# Patient Record
Sex: FEMALE | Race: WHITE | Employment: UNEMPLOYED | URBAN - METROPOLITAN AREA
[De-identification: names, ages, dates, MRNs, and addresses within clinical notes are randomized per-mention and may not be internally consistent; named-entity substitution may affect disease eponyms.]

---

## 2022-07-02 ENCOUNTER — APPOINTMENT (OUTPATIENT)
Dept: CT IMAGING | Age: 37
End: 2022-07-02
Attending: EMERGENCY MEDICINE
Payer: MEDICAID

## 2022-07-02 ENCOUNTER — APPOINTMENT (OUTPATIENT)
Dept: CT IMAGING | Age: 37
DRG: 385 | End: 2022-07-02
Attending: EMERGENCY MEDICINE
Payer: MEDICAID

## 2022-07-02 ENCOUNTER — HOSPITAL ENCOUNTER (INPATIENT)
Facility: HOSPITAL | Age: 37
LOS: 2 days | Discharge: HOME | End: 2022-07-04
Attending: EMERGENCY MEDICINE | Admitting: HOSPITALIST
Payer: MEDICAID

## 2022-07-02 ENCOUNTER — HOSPITAL ENCOUNTER (INPATIENT)
Facility: HOSPITAL | Age: 37
LOS: 2 days | Discharge: HOME | DRG: 385 | End: 2022-07-04
Attending: EMERGENCY MEDICINE | Admitting: HOSPITALIST
Payer: MEDICAID

## 2022-07-02 DIAGNOSIS — K63.2 FISTULA OF INTESTINE: ICD-10-CM

## 2022-07-02 DIAGNOSIS — D64.9 CHRONIC ANEMIA: ICD-10-CM

## 2022-07-02 DIAGNOSIS — K50.013 CROHN'S DISEASE OF SMALL INTESTINE WITH FISTULA (HCC): ICD-10-CM

## 2022-07-02 DIAGNOSIS — D72.829 LEUKOCYTOSIS, UNSPECIFIED TYPE: ICD-10-CM

## 2022-07-02 DIAGNOSIS — R10.9 CHRONIC ABDOMINAL PAIN: Primary | ICD-10-CM

## 2022-07-02 DIAGNOSIS — N39.0 ACUTE UTI: ICD-10-CM

## 2022-07-02 DIAGNOSIS — E86.0 DEHYDRATION: ICD-10-CM

## 2022-07-02 DIAGNOSIS — R73.9 HYPERGLYCEMIA: ICD-10-CM

## 2022-07-02 DIAGNOSIS — G89.29 CHRONIC ABDOMINAL PAIN: Primary | ICD-10-CM

## 2022-07-02 DIAGNOSIS — E87.1 HYPONATREMIA: ICD-10-CM

## 2022-07-02 DIAGNOSIS — N28.9 RENAL INSUFFICIENCY: ICD-10-CM

## 2022-07-02 DIAGNOSIS — E87.6 HYPOKALEMIA: ICD-10-CM

## 2022-07-02 LAB
ADENOVIRUS F 40/41 PCR: NEGATIVE
ALBUMIN SERPL-MCNC: 3.3 G/DL (ref 3.4–5)
ALBUMIN/GLOB SERPL: 0.7 {RATIO} (ref 1–2)
ALP LIVER SERPL-CCNC: 75 U/L
ALT SERPL-CCNC: 24 U/L
ANION GAP SERPL CALC-SCNC: 8 MMOL/L (ref 0–18)
AST SERPL-CCNC: 20 U/L (ref 15–37)
ASTROVIRUS PCR: NEGATIVE
B-HCG UR QL: NEGATIVE
BASOPHILS # BLD AUTO: 0.03 X10(3) UL (ref 0–0.2)
BASOPHILS NFR BLD AUTO: 0.2 %
BILIRUB SERPL-MCNC: 0.7 MG/DL (ref 0.1–2)
BILIRUB UR QL CFM: NEGATIVE
BUN BLD-MCNC: 11 MG/DL (ref 7–18)
C CAYETANENSIS DNA SPEC QL NAA+PROBE: NEGATIVE
C DIFF TOX B STL QL: NEGATIVE
CALCIUM BLD-MCNC: 9.2 MG/DL (ref 8.5–10.1)
CAMPY SP DNA.DIARRHEA STL QL NAA+PROBE: NEGATIVE
CHLORIDE SERPL-SCNC: 100 MMOL/L (ref 98–112)
CO2 SERPL-SCNC: 27 MMOL/L (ref 21–32)
COLOR UR AUTO: YELLOW
CREAT BLD-MCNC: 1.35 MG/DL
CRP SERPL-MCNC: 7.22 MG/DL (ref ?–0.3)
CRYPTOSP DNA SPEC QL NAA+PROBE: NEGATIVE
DEPRECATED HBV CORE AB SER IA-ACNC: 67.4 NG/ML
EAEC PAA PLAS AGGR+AATA ST NAA+NON-PRB: NEGATIVE
EC STX1+STX2 + H7 FLIC SPEC NAA+PROBE: NEGATIVE
ENTAMOEBA HISTOLYTICA PCR: NEGATIVE
EOSINOPHIL # BLD AUTO: 0.14 X10(3) UL (ref 0–0.7)
EOSINOPHIL NFR BLD AUTO: 1.1 %
EPEC EAE GENE STL QL NAA+NON-PROBE: NEGATIVE
ERYTHROCYTE [DISTWIDTH] IN BLOOD BY AUTOMATED COUNT: 18.7 %
ERYTHROCYTE [SEDIMENTATION RATE] IN BLOOD: 68 MM/HR
ETEC LTA+ST1A+ST1B TOX ST NAA+NON-PROBE: NEGATIVE
GIARDIA LAMBLIA PCR: NEGATIVE
GLOBULIN PLAS-MCNC: 4.8 G/DL (ref 2.8–4.4)
GLUCOSE BLD-MCNC: 143 MG/DL (ref 70–99)
GLUCOSE UR STRIP.AUTO-MCNC: NEGATIVE MG/DL
HBV SURFACE AB SER QL: NONREACTIVE
HBV SURFACE AB SERPL IA-ACNC: <3.1 MIU/ML
HBV SURFACE AG SER-ACNC: 0.12 [IU]/L
HBV SURFACE AG SERPL QL IA: NONREACTIVE
HCT VFR BLD AUTO: 28.4 %
HGB BLD-MCNC: 7.9 G/DL
IMM GRANULOCYTES # BLD AUTO: 0.06 X10(3) UL (ref 0–1)
IMM GRANULOCYTES NFR BLD: 0.5 %
INR BLD: 1.2 (ref 0.8–1.2)
IRON SATN MFR SERPL: 5 %
IRON SERPL-MCNC: 16 UG/DL
LEUKOCYTE ESTERASE UR QL STRIP.AUTO: NEGATIVE
LIPASE SERPL-CCNC: 139 U/L (ref 73–393)
LYMPHOCYTES # BLD AUTO: 0.83 X10(3) UL (ref 1–4)
LYMPHOCYTES NFR BLD AUTO: 6.7 %
MCH RBC QN AUTO: 17.4 PG (ref 26–34)
MCHC RBC AUTO-ENTMCNC: 27.8 G/DL (ref 31–37)
MCV RBC AUTO: 62.4 FL
MONOCYTES # BLD AUTO: 0.98 X10(3) UL (ref 0.1–1)
MONOCYTES NFR BLD AUTO: 7.9 %
NEUTROPHILS # BLD AUTO: 10.33 X10 (3) UL (ref 1.5–7.7)
NEUTROPHILS # BLD AUTO: 10.33 X10(3) UL (ref 1.5–7.7)
NEUTROPHILS NFR BLD AUTO: 83.6 %
NITRITE UR QL STRIP.AUTO: NEGATIVE
NOROVIRUS GI/GII PCR: NEGATIVE
OSMOLALITY SERPL CALC.SUM OF ELEC: 282 MOSM/KG (ref 275–295)
P SHIGELLOIDES DNA STL QL NAA+PROBE: NEGATIVE
PH UR STRIP.AUTO: 5.5 [PH] (ref 5–8)
PLATELET # BLD AUTO: 607 10(3)UL (ref 150–450)
PLATELET MORPHOLOGY: NORMAL
POTASSIUM SERPL-SCNC: 3.4 MMOL/L (ref 3.5–5.1)
PROT SERPL-MCNC: 8.1 G/DL (ref 6.4–8.2)
PROTHROMBIN TIME: 15.2 SECONDS (ref 11.6–14.8)
RBC # BLD AUTO: 4.55 X10(6)UL
RBC UR QL AUTO: NEGATIVE
ROTAVIRUS A PCR: NEGATIVE
SALMONELLA DNA SPEC QL NAA+PROBE: NEGATIVE
SAPOVIRUS PCR: NEGATIVE
SARS-COV-2 RNA RESP QL NAA+PROBE: NOT DETECTED
SHIGELLA SP+EIEC IPAH ST NAA+NON-PROBE: NEGATIVE
SODIUM SERPL-SCNC: 135 MMOL/L (ref 136–145)
SP GR UR STRIP.AUTO: >=1.03 (ref 1–1.03)
TIBC SERPL-MCNC: 320 UG/DL (ref 240–450)
TRANSFERRIN SERPL-MCNC: 215 MG/DL (ref 200–360)
UROBILINOGEN UR STRIP.AUTO-MCNC: 0.2 MG/DL
V CHOLERAE DNA SPEC QL NAA+PROBE: NEGATIVE
VIBRIO DNA SPEC NAA+PROBE: NEGATIVE
VIT B12 SERPL-MCNC: 279 PG/ML (ref 193–986)
VIT D+METAB SERPL-MCNC: 22.1 NG/ML (ref 30–100)
WBC # BLD AUTO: 12.4 X10(3) UL (ref 4–11)
YERSINIA DNA SPEC NAA+PROBE: NEGATIVE

## 2022-07-02 PROCEDURE — 99223 1ST HOSP IP/OBS HIGH 75: CPT | Performed by: INTERNAL MEDICINE

## 2022-07-02 PROCEDURE — 74177 CT ABD & PELVIS W/CONTRAST: CPT | Performed by: EMERGENCY MEDICINE

## 2022-07-02 PROCEDURE — 99253 IP/OBS CNSLTJ NEW/EST LOW 45: CPT | Performed by: SURGERY

## 2022-07-02 RX ORDER — POTASSIUM CHLORIDE 20 MEQ/1
40 TABLET, EXTENDED RELEASE ORAL ONCE
Status: COMPLETED | OUTPATIENT
Start: 2022-07-02 | End: 2022-07-02

## 2022-07-02 RX ORDER — CEPHALEXIN 500 MG/1
500 CAPSULE ORAL 2 TIMES DAILY
Qty: 20 CAPSULE | Refills: 0 | Status: SHIPPED | OUTPATIENT
Start: 2022-07-02 | End: 2022-07-04

## 2022-07-02 RX ORDER — MORPHINE SULFATE 2 MG/ML
0.5 INJECTION, SOLUTION INTRAMUSCULAR; INTRAVENOUS EVERY 2 HOUR PRN
Status: DISCONTINUED | OUTPATIENT
Start: 2022-07-02 | End: 2022-07-04

## 2022-07-02 RX ORDER — ONDANSETRON 2 MG/ML
4 INJECTION INTRAMUSCULAR; INTRAVENOUS EVERY 6 HOURS PRN
Status: DISCONTINUED | OUTPATIENT
Start: 2022-07-02 | End: 2022-07-04

## 2022-07-02 RX ORDER — FAMOTIDINE 40 MG/1
40 TABLET, FILM COATED ORAL DAILY
COMMUNITY

## 2022-07-02 RX ORDER — SODIUM PHOSPHATE, DIBASIC AND SODIUM PHOSPHATE, MONOBASIC 7; 19 G/133ML; G/133ML
1 ENEMA RECTAL ONCE AS NEEDED
Status: DISCONTINUED | OUTPATIENT
Start: 2022-07-02 | End: 2022-07-04

## 2022-07-02 RX ORDER — CHOLECALCIFEROL (VITAMIN D3) 125 MCG
2000 CAPSULE ORAL DAILY
Status: DISCONTINUED | OUTPATIENT
Start: 2022-07-02 | End: 2022-07-02

## 2022-07-02 RX ORDER — MORPHINE SULFATE 2 MG/ML
1 INJECTION, SOLUTION INTRAMUSCULAR; INTRAVENOUS EVERY 2 HOUR PRN
Status: DISCONTINUED | OUTPATIENT
Start: 2022-07-02 | End: 2022-07-04

## 2022-07-02 RX ORDER — FERROUS SULFATE TAB EC 324 MG (65 MG FE EQUIVALENT) 324 (65 FE) MG
324 TABLET DELAYED RESPONSE ORAL DAILY
COMMUNITY

## 2022-07-02 RX ORDER — ACETAMINOPHEN 500 MG
500 TABLET ORAL EVERY 4 HOURS PRN
Status: DISCONTINUED | OUTPATIENT
Start: 2022-07-02 | End: 2022-07-04

## 2022-07-02 RX ORDER — SENNOSIDES 8.6 MG
17.2 TABLET ORAL NIGHTLY PRN
Status: DISCONTINUED | OUTPATIENT
Start: 2022-07-02 | End: 2022-07-04

## 2022-07-02 RX ORDER — POLYETHYLENE GLYCOL 3350 17 G/17G
17 POWDER, FOR SOLUTION ORAL DAILY PRN
Status: DISCONTINUED | OUTPATIENT
Start: 2022-07-02 | End: 2022-07-04

## 2022-07-02 RX ORDER — HYDROCODONE BITARTRATE AND ACETAMINOPHEN 5; 325 MG/1; MG/1
2 TABLET ORAL EVERY 4 HOURS PRN
Status: DISCONTINUED | OUTPATIENT
Start: 2022-07-02 | End: 2022-07-04

## 2022-07-02 RX ORDER — MORPHINE SULFATE 2 MG/ML
2 INJECTION, SOLUTION INTRAMUSCULAR; INTRAVENOUS EVERY 2 HOUR PRN
Status: DISCONTINUED | OUTPATIENT
Start: 2022-07-02 | End: 2022-07-04

## 2022-07-02 RX ORDER — MELATONIN
3 NIGHTLY PRN
Status: DISCONTINUED | OUTPATIENT
Start: 2022-07-02 | End: 2022-07-04

## 2022-07-02 RX ORDER — PROCHLORPERAZINE EDISYLATE 5 MG/ML
5 INJECTION INTRAMUSCULAR; INTRAVENOUS EVERY 8 HOURS PRN
Status: DISCONTINUED | OUTPATIENT
Start: 2022-07-02 | End: 2022-07-04

## 2022-07-02 RX ORDER — HYDROCODONE BITARTRATE AND ACETAMINOPHEN 5; 325 MG/1; MG/1
1 TABLET ORAL EVERY 4 HOURS PRN
Status: DISCONTINUED | OUTPATIENT
Start: 2022-07-02 | End: 2022-07-04

## 2022-07-02 RX ORDER — ACETAMINOPHEN 325 MG/1
650 TABLET ORAL EVERY 4 HOURS PRN
Status: DISCONTINUED | OUTPATIENT
Start: 2022-07-02 | End: 2022-07-04

## 2022-07-02 RX ORDER — SODIUM CHLORIDE 9 MG/ML
INJECTION, SOLUTION INTRAVENOUS CONTINUOUS
Status: DISCONTINUED | OUTPATIENT
Start: 2022-07-02 | End: 2022-07-03

## 2022-07-02 RX ORDER — KETOROLAC TROMETHAMINE 15 MG/ML
15 INJECTION, SOLUTION INTRAMUSCULAR; INTRAVENOUS ONCE
Status: COMPLETED | OUTPATIENT
Start: 2022-07-02 | End: 2022-07-02

## 2022-07-02 RX ORDER — BISACODYL 10 MG
10 SUPPOSITORY, RECTAL RECTAL
Status: DISCONTINUED | OUTPATIENT
Start: 2022-07-02 | End: 2022-07-04

## 2022-07-02 RX ORDER — DICYCLOMINE HCL 20 MG
20 TABLET ORAL 4 TIMES DAILY PRN
Qty: 30 TABLET | Refills: 0 | Status: SHIPPED | OUTPATIENT
Start: 2022-07-02 | End: 2022-08-01

## 2022-07-02 RX ORDER — PHENAZOPYRIDINE HYDROCHLORIDE 200 MG/1
200 TABLET, FILM COATED ORAL 3 TIMES DAILY PRN
Qty: 6 TABLET | Refills: 0 | Status: SHIPPED | OUTPATIENT
Start: 2022-07-02 | End: 2022-07-09

## 2022-07-02 NOTE — PROGRESS NOTES
4063: Paged  to inquire about a GI consult. Orders placed by Elizabeth Hutton. 1012: Paged  to notify of new consult. He will see patient later. 1421: Paged Chato Kemp to notify of new consult. She will see patient later and is ok with a full liquid diet (as per 's recommendations).

## 2022-07-02 NOTE — PLAN OF CARE
Problem: Patient/Family Goals  Goal: Patient/Family Long Term Goal  Description: Patient's Long Term Goal: Discharge home    Interventions:  - Pain tolerable  - Tolerating diet  - Return to previous ADL's  - See additional Care Plan goals for specific interventions  Outcome: Progressing  Goal: Patient/Family Short Term Goal  Description: Patient's Short Term Goal: Prepare for discharge home    Interventions:   - Advance diet as tolerated  - Transition IV to oral pain medication  - Encourage ambulation  - See additional Care Plan goals for specific interventions  Outcome: Progressing     Problem: GASTROINTESTINAL - ADULT  Goal: Minimal or absence of nausea and vomiting  Description: INTERVENTIONS:  - Maintain adequate hydration with IV or PO as ordered and tolerated  - Nasogastric tube to low intermittent suction as ordered  - Evaluate effectiveness of ordered antiemetic medications  - Provide nonpharmacologic comfort measures as appropriate  - Advance diet as tolerated, if ordered  - Obtain nutritional consult as needed  - Evaluate fluid balance  Outcome: Progressing  Goal: Maintains or returns to baseline bowel function  Description: INTERVENTIONS:  - Assess bowel function  - Maintain adequate hydration with IV or PO as ordered and tolerated  - Evaluate effectiveness of GI medications  - Encourage mobilization and activity  - Obtain nutritional consult as needed  - Establish a toileting routine/schedule  - Consider collaborating with pharmacy to review patient's medication profile  Outcome: Progressing  Goal: Maintains adequate nutritional intake (undernourished)  Description: INTERVENTIONS:  - Monitor percentage of each meal consumed  - Identify factors contributing to decreased intake, treat as appropriate  - Assist with meals as needed  - Monitor I&O, WT and lab values  - Obtain nutritional consult as needed  - Optimize oral hygiene and moisture  - Encourage food from home; allow for food preferences  - Enhance eating environment  Outcome: Progressing  Goal: Achieves appropriate nutritional intake (bariatric)  Description: INTERVENTIONS:  - Monitor for over-consumption  - Identify factors contributing to increased intake, treat as appropriate  - Monitor I&O, WT and lab values  - Obtain nutritional consult as needed  - Evaluate psychosocial factors contributing to over-consumption  Outcome: Progressing       NURSING ADMISSION NOTE      Patient admitted via Ambulance  Oriented to room. Safety precautions initiated. Bed in low position. Call light in reach. Patient arrived to the floor via ambulance from [facility] in stable condition at . No family present at bedside.

## 2022-07-02 NOTE — ED INITIAL ASSESSMENT (HPI)
Pt to the ED for evaluation of L lower pelvic pain for 2 months that sometimes shoots across her entire abdomen. Pt also reports nausea and occasional fevers. Saw PCP and had a pelvic US and was told she had a cyst, but she's not sure where. No pain medications taken.

## 2022-07-02 NOTE — PROGRESS NOTES
Patient has IV fluids infusing, IV Zosyn scheduled, on room air, on telemetry running NSR, voiding well, c-diff negative, ambulates independently, C/O minimal pain and nausea-denies needing medications, just placed on a full liquid diet, potassium replaced per protocol. Patient updated on plan of care.

## 2022-07-02 NOTE — ED QUICK NOTES
Orders for admission, patient is aware of plan and ready to go upstairs. Any questions, please call ED RN Kelsy at extension [phone].      Patient Covid vaccination status: Unvaccinated     COVID Test Ordered in ED: Rapid SARS-CoV-2 by PCR    COVID Suspicion at Admission: Low clinical suspicion for COVID    Running Infusions:  n/a    Mental Status/LOC at time of transport: awake, alert and oriented    Other pertinent information:   CIWA score: N/A   NIH score:  N/A

## 2022-07-03 LAB
ALBUMIN SERPL-MCNC: 2.5 G/DL (ref 3.4–5)
ALBUMIN/GLOB SERPL: 0.7 {RATIO} (ref 1–2)
ALP LIVER SERPL-CCNC: 55 U/L
ALT SERPL-CCNC: 14 U/L
ANION GAP SERPL CALC-SCNC: 5 MMOL/L (ref 0–18)
ANTIBODY SCREEN: NEGATIVE
AST SERPL-CCNC: 17 U/L (ref 15–37)
BASOPHILS # BLD AUTO: 0.04 X10(3) UL (ref 0–0.2)
BASOPHILS NFR BLD AUTO: 0.4 %
BILIRUB SERPL-MCNC: 1.1 MG/DL (ref 0.1–2)
BUN BLD-MCNC: 6 MG/DL (ref 7–18)
CALCIUM BLD-MCNC: 8.5 MG/DL (ref 8.5–10.1)
CHLORIDE SERPL-SCNC: 109 MMOL/L (ref 98–112)
CO2 SERPL-SCNC: 24 MMOL/L (ref 21–32)
CREAT BLD-MCNC: 1.11 MG/DL
EOSINOPHIL # BLD AUTO: 0.04 X10(3) UL (ref 0–0.7)
EOSINOPHIL NFR BLD AUTO: 0.4 %
ERYTHROCYTE [DISTWIDTH] IN BLOOD BY AUTOMATED COUNT: 18.4 %
GLOBULIN PLAS-MCNC: 3.8 G/DL (ref 2.8–4.4)
GLUCOSE BLD-MCNC: 128 MG/DL (ref 70–99)
HCT VFR BLD AUTO: 22.9 %
HGB BLD-MCNC: 6.5 G/DL
HGB BLD-MCNC: 7.9 G/DL
IMM GRANULOCYTES # BLD AUTO: 0.05 X10(3) UL (ref 0–1)
IMM GRANULOCYTES NFR BLD: 0.5 %
LYMPHOCYTES # BLD AUTO: 0.66 X10(3) UL (ref 1–4)
LYMPHOCYTES NFR BLD AUTO: 6.5 %
MCH RBC QN AUTO: 18.1 PG (ref 26–34)
MCHC RBC AUTO-ENTMCNC: 28.4 G/DL (ref 31–37)
MCV RBC AUTO: 63.8 FL
MONOCYTES # BLD AUTO: 0.74 X10(3) UL (ref 0.1–1)
MONOCYTES NFR BLD AUTO: 7.3 %
NEUTROPHILS # BLD AUTO: 8.61 X10 (3) UL (ref 1.5–7.7)
NEUTROPHILS # BLD AUTO: 8.61 X10(3) UL (ref 1.5–7.7)
NEUTROPHILS NFR BLD AUTO: 84.9 %
OSMOLALITY SERPL CALC.SUM OF ELEC: 285 MOSM/KG (ref 275–295)
PLATELET # BLD AUTO: 426 10(3)UL (ref 150–450)
POTASSIUM SERPL-SCNC: 3.8 MMOL/L (ref 3.5–5.1)
POTASSIUM SERPL-SCNC: 3.8 MMOL/L (ref 3.5–5.1)
PROT SERPL-MCNC: 6.3 G/DL (ref 6.4–8.2)
RBC # BLD AUTO: 3.59 X10(6)UL
RH BLOOD TYPE: POSITIVE
SODIUM SERPL-SCNC: 138 MMOL/L (ref 136–145)
WBC # BLD AUTO: 10.1 X10(3) UL (ref 4–11)

## 2022-07-03 PROCEDURE — 99232 SBSQ HOSP IP/OBS MODERATE 35: CPT | Performed by: SURGERY

## 2022-07-03 PROCEDURE — 30233N1 TRANSFUSION OF NONAUTOLOGOUS RED BLOOD CELLS INTO PERIPHERAL VEIN, PERCUTANEOUS APPROACH: ICD-10-PCS | Performed by: INTERNAL MEDICINE

## 2022-07-03 PROCEDURE — 99232 SBSQ HOSP IP/OBS MODERATE 35: CPT | Performed by: INTERNAL MEDICINE

## 2022-07-03 RX ORDER — SODIUM CHLORIDE 9 MG/ML
INJECTION, SOLUTION INTRAVENOUS CONTINUOUS
Status: ACTIVE | OUTPATIENT
Start: 2022-07-03 | End: 2022-07-04

## 2022-07-03 RX ORDER — SODIUM CHLORIDE 9 MG/ML
INJECTION, SOLUTION INTRAVENOUS ONCE
Status: COMPLETED | OUTPATIENT
Start: 2022-07-03 | End: 2022-07-03

## 2022-07-03 NOTE — PLAN OF CARE
Patient resting in bed. VSS. Unit of blood transfusing, RN stayed with patient first 15 minutes. No reaction, patient tolerating well. Verified blood with Shani LOPEZ. Denies any lightheadedness/dizziness. Complaints of fatigue. POC discussed, all questions and concerns addressed. All safety measures in place.        Problem: Patient/Family Goals  Goal: Patient/Family Long Term Goal  Description: Patient's Long Term Goal: Discharge home    Interventions:  - Pain tolerable  - Tolerating diet  - Return to previous ADL's  - See additional Care Plan goals for specific interventions  7/3/2022 1022 by Edmond Whalen RN  Outcome: Progressing  7/3/2022 1022 by Edmond Whalen RN  Outcome: Progressing  Goal: Patient/Family Short Term Goal  Description: Patient's Short Term Goal: Prepare for discharge home    Interventions:   - Advance diet as tolerated  - Transition IV to oral pain medication  - Encourage ambulation  - See additional Care Plan goals for specific interventions  7/3/2022 1022 by Edmond Whalen RN  Outcome: Progressing  7/3/2022 1022 by Edmond Whalen RN  Outcome: Progressing     Problem: GASTROINTESTINAL - ADULT  Goal: Minimal or absence of nausea and vomiting  Description: INTERVENTIONS:  - Maintain adequate hydration with IV or PO as ordered and tolerated  - Nasogastric tube to low intermittent suction as ordered  - Evaluate effectiveness of ordered antiemetic medications  - Provide nonpharmacologic comfort measures as appropriate  - Advance diet as tolerated, if ordered  - Obtain nutritional consult as needed  - Evaluate fluid balance  7/3/2022 1022 by Edmond Whalen RN  Outcome: Progressing  7/3/2022 1022 by Edmond Whalen RN  Outcome: Progressing  Goal: Maintains or returns to baseline bowel function  Description: INTERVENTIONS:  - Assess bowel function  - Maintain adequate hydration with IV or PO as ordered and tolerated  - Evaluate effectiveness of GI medications  - Encourage mobilization and activity  - Obtain nutritional consult as needed  - Establish a toileting routine/schedule  - Consider collaborating with pharmacy to review patient's medication profile  7/3/2022 1022 by Josefa Grajeda RN  Outcome: Progressing  7/3/2022 1022 by Josefa Grajeda RN  Outcome: Progressing  Goal: Maintains adequate nutritional intake (undernourished)  Description: INTERVENTIONS:  - Monitor percentage of each meal consumed  - Identify factors contributing to decreased intake, treat as appropriate  - Assist with meals as needed  - Monitor I&O, WT and lab values  - Obtain nutritional consult as needed  - Optimize oral hygiene and moisture  - Encourage food from home; allow for food preferences  - Enhance eating environment  7/3/2022 1022 by Josefa Grajeda RN  Outcome: Progressing  7/3/2022 1022 by Josefa Grajeda RN  Outcome: Progressing  Goal: Achieves appropriate nutritional intake (bariatric)  Description: INTERVENTIONS:  - Monitor for over-consumption  - Identify factors contributing to increased intake, treat as appropriate  - Monitor I&O, WT and lab values  - Obtain nutritional consult as needed  - Evaluate psychosocial factors contributing to over-consumption  7/3/2022 1022 by Josefa Grajeda RN  Outcome: Progressing  7/3/2022 1022 by Josefa Grajeda RN  Outcome: Progressing     Problem: HEMATOLOGIC - ADULT  Goal: Maintains hematologic stability  Description: INTERVENTIONS  - Assess for signs and symptoms of bleeding or hemorrhage  - Monitor labs and vital signs for trends  - Administer supportive blood products/factors, fluids and medications as ordered and appropriate  - Administer supportive blood products/factors as ordered and appropriate  Outcome: Progressing

## 2022-07-03 NOTE — PLAN OF CARE
Upon assessment, pt is resting in bed. A&O x4, tolerating RA. Tele - NSR w/ HR in 80s. ST to 100s w/ exertion. Tmax 101.2 treated per MAR. Pt denies chest pain, sob, n/v. Voiding in toilet & passing gas. Tolerating full liquid diet; NPO at midnight. IVFs infusing; IV site clean/dry/intact. Pt updated on poc & instructed to use call light if in need; verbalized understanding. Call light within reach.      Problem: Patient/Family Goals  Goal: Patient/Family Long Term Goal  Description: Patient's Long Term Goal: Discharge home    Interventions:  - Pain tolerable  - Tolerating diet  - Return to previous ADL's  - See additional Care Plan goals for specific interventions  Outcome: Progressing  Goal: Patient/Family Short Term Goal  Description: Patient's Short Term Goal: Prepare for discharge home    Interventions:   - Advance diet as tolerated  - Transition IV to oral pain medication  - Encourage ambulation  - See additional Care Plan goals for specific interventions  Outcome: Progressing     Problem: GASTROINTESTINAL - ADULT  Goal: Minimal or absence of nausea and vomiting  Description: INTERVENTIONS:  - Maintain adequate hydration with IV or PO as ordered and tolerated  - Nasogastric tube to low intermittent suction as ordered  - Evaluate effectiveness of ordered antiemetic medications  - Provide nonpharmacologic comfort measures as appropriate  - Advance diet as tolerated, if ordered  - Obtain nutritional consult as needed  - Evaluate fluid balance  Outcome: Progressing  Goal: Maintains or returns to baseline bowel function  Description: INTERVENTIONS:  - Assess bowel function  - Maintain adequate hydration with IV or PO as ordered and tolerated  - Evaluate effectiveness of GI medications  - Encourage mobilization and activity  - Obtain nutritional consult as needed  - Establish a toileting routine/schedule  - Consider collaborating with pharmacy to review patient's medication profile  Outcome: Progressing  Goal: Maintains adequate nutritional intake (undernourished)  Description: INTERVENTIONS:  - Monitor percentage of each meal consumed  - Identify factors contributing to decreased intake, treat as appropriate  - Assist with meals as needed  - Monitor I&O, WT and lab values  - Obtain nutritional consult as needed  - Optimize oral hygiene and moisture  - Encourage food from home; allow for food preferences  - Enhance eating environment  Outcome: Progressing  Goal: Achieves appropriate nutritional intake (bariatric)  Description: INTERVENTIONS:  - Monitor for over-consumption  - Identify factors contributing to increased intake, treat as appropriate  - Monitor I&O, WT and lab values  - Obtain nutritional consult as needed  - Evaluate psychosocial factors contributing to over-consumption  Outcome: Progressing

## 2022-07-03 NOTE — PLAN OF CARE
Report received and care transferred to writing RN from [facility] around 5307. Pt stable, A&O x4. Telemetry in place. IVF infusing. Bed locked in low position, call light in reach, rounding provided.

## 2022-07-04 VITALS
HEIGHT: 60 IN | RESPIRATION RATE: 16 BRPM | TEMPERATURE: 98 F | SYSTOLIC BLOOD PRESSURE: 130 MMHG | DIASTOLIC BLOOD PRESSURE: 80 MMHG | BODY MASS INDEX: 27.48 KG/M2 | HEART RATE: 76 BPM | WEIGHT: 140 LBS | OXYGEN SATURATION: 96 %

## 2022-07-04 LAB
ALBUMIN SERPL-MCNC: 2.6 G/DL (ref 3.4–5)
ALBUMIN/GLOB SERPL: 0.7 {RATIO} (ref 1–2)
ALP LIVER SERPL-CCNC: 55 U/L
ALT SERPL-CCNC: 12 U/L
ANION GAP SERPL CALC-SCNC: 7 MMOL/L (ref 0–18)
AST SERPL-CCNC: 11 U/L (ref 15–37)
BASOPHILS # BLD AUTO: 0.08 X10(3) UL (ref 0–0.2)
BASOPHILS NFR BLD AUTO: 0.8 %
BILIRUB SERPL-MCNC: 1.1 MG/DL (ref 0.1–2)
BLOOD TYPE BARCODE: 5100
BUN BLD-MCNC: 5 MG/DL (ref 7–18)
CALCIUM BLD-MCNC: 9 MG/DL (ref 8.5–10.1)
CHLORIDE SERPL-SCNC: 110 MMOL/L (ref 98–112)
CO2 SERPL-SCNC: 23 MMOL/L (ref 21–32)
CREAT BLD-MCNC: 1.03 MG/DL
EOSINOPHIL # BLD AUTO: 0.14 X10(3) UL (ref 0–0.7)
EOSINOPHIL NFR BLD AUTO: 1.4 %
ERYTHROCYTE [DISTWIDTH] IN BLOOD BY AUTOMATED COUNT: 21 %
GLOBULIN PLAS-MCNC: 4 G/DL (ref 2.8–4.4)
GLUCOSE BLD-MCNC: 86 MG/DL (ref 70–99)
HCT VFR BLD AUTO: 27.8 %
HGB BLD-MCNC: 8.1 G/DL
IMM GRANULOCYTES # BLD AUTO: 0.08 X10(3) UL (ref 0–1)
IMM GRANULOCYTES NFR BLD: 0.8 %
LYMPHOCYTES # BLD AUTO: 1.09 X10(3) UL (ref 1–4)
LYMPHOCYTES NFR BLD AUTO: 11 %
MCH RBC QN AUTO: 19.3 PG (ref 26–34)
MCHC RBC AUTO-ENTMCNC: 29.1 G/DL (ref 31–37)
MCV RBC AUTO: 66.2 FL
MONOCYTES # BLD AUTO: 0.95 X10(3) UL (ref 0.1–1)
MONOCYTES NFR BLD AUTO: 9.6 %
NEUTROPHILS # BLD AUTO: 7.58 X10 (3) UL (ref 1.5–7.7)
NEUTROPHILS # BLD AUTO: 7.58 X10(3) UL (ref 1.5–7.7)
NEUTROPHILS NFR BLD AUTO: 76.4 %
OSMOLALITY SERPL CALC.SUM OF ELEC: 287 MOSM/KG (ref 275–295)
PLATELET # BLD AUTO: 417 10(3)UL (ref 150–450)
POTASSIUM SERPL-SCNC: 3.8 MMOL/L (ref 3.5–5.1)
PROT SERPL-MCNC: 6.6 G/DL (ref 6.4–8.2)
RBC # BLD AUTO: 4.2 X10(6)UL
SODIUM SERPL-SCNC: 140 MMOL/L (ref 136–145)
WBC # BLD AUTO: 9.9 X10(3) UL (ref 4–11)

## 2022-07-04 PROCEDURE — 99232 SBSQ HOSP IP/OBS MODERATE 35: CPT | Performed by: SURGERY

## 2022-07-04 PROCEDURE — 99239 HOSP IP/OBS DSCHRG MGMT >30: CPT | Performed by: INTERNAL MEDICINE

## 2022-07-04 RX ORDER — ERGOCALCIFEROL 1.25 MG/1
50000 CAPSULE ORAL WEEKLY
Qty: 12 CAPSULE | Refills: 1 | Status: SHIPPED | OUTPATIENT
Start: 2022-07-04 | End: 2022-10-02

## 2022-07-04 RX ORDER — METRONIDAZOLE 500 MG/1
500 TABLET ORAL EVERY 8 HOURS SCHEDULED
Status: DISCONTINUED | OUTPATIENT
Start: 2022-07-04 | End: 2022-07-04

## 2022-07-04 RX ORDER — METRONIDAZOLE 500 MG/1
500 TABLET ORAL EVERY 8 HOURS SCHEDULED
Qty: 42 TABLET | Refills: 0 | Status: SHIPPED | OUTPATIENT
Start: 2022-07-04 | End: 2022-07-18

## 2022-07-04 RX ORDER — AMOXICILLIN AND CLAVULANATE POTASSIUM 875; 125 MG/1; MG/1
1 TABLET, FILM COATED ORAL 2 TIMES DAILY
Qty: 28 TABLET | Refills: 0 | Status: SHIPPED | OUTPATIENT
Start: 2022-07-04 | End: 2022-07-18

## 2022-07-04 NOTE — DISCHARGE INSTRUCTIONS
Continue with low residue diet as tolerated. You will need to take Psyllium Husk Capsules, take 2 capsules twice a day. If you develop worsening abdominal pain, fever >101, or are unable to tolerate a diet, call your doctor or return to emergency room.

## 2022-07-04 NOTE — PLAN OF CARE
Upon assessment pt is a&o x4, VSS and afebrile. Pt denies calf pain, chest pain and MANAS. RA. Tele- NSR. Pt on full liquid diet, denies n/v. Voids freely. Up ad tomas. IV abx infusing. Pt reports some back discomfort but denies the need for pain medication at this time. Pt resting in bed with call light within reach and safety precautions in place. Will continue to monitor.     Problem: Patient/Family Goals  Goal: Patient/Family Long Term Goal  Description: Patient's Long Term Goal: Discharge home    Interventions:  - Pain tolerable  - Tolerating diet  - Return to previous ADL's  - See additional Care Plan goals for specific interventions  Outcome: Progressing  Goal: Patient/Family Short Term Goal  Description: Patient's Short Term Goal: Prepare for discharge home    Interventions:   - Advance diet as tolerated  - Transition IV to oral pain medication  - Encourage ambulation  - See additional Care Plan goals for specific interventions  Outcome: Progressing     Problem: GASTROINTESTINAL - ADULT  Goal: Minimal or absence of nausea and vomiting  Description: INTERVENTIONS:  - Maintain adequate hydration with IV or PO as ordered and tolerated  - Nasogastric tube to low intermittent suction as ordered  - Evaluate effectiveness of ordered antiemetic medications  - Provide nonpharmacologic comfort measures as appropriate  - Advance diet as tolerated, if ordered  - Obtain nutritional consult as needed  - Evaluate fluid balance  Outcome: Progressing  Goal: Maintains or returns to baseline bowel function  Description: INTERVENTIONS:  - Assess bowel function  - Maintain adequate hydration with IV or PO as ordered and tolerated  - Evaluate effectiveness of GI medications  - Encourage mobilization and activity  - Obtain nutritional consult as needed  - Establish a toileting routine/schedule  - Consider collaborating with pharmacy to review patient's medication profile  Outcome: Progressing  Goal: Maintains adequate nutritional intake (undernourished)  Description: INTERVENTIONS:  - Monitor percentage of each meal consumed  - Identify factors contributing to decreased intake, treat as appropriate  - Assist with meals as needed  - Monitor I&O, WT and lab values  - Obtain nutritional consult as needed  - Optimize oral hygiene and moisture  - Encourage food from home; allow for food preferences  - Enhance eating environment  Outcome: Progressing  Goal: Achieves appropriate nutritional intake (bariatric)  Description: INTERVENTIONS:  - Monitor for over-consumption  - Identify factors contributing to increased intake, treat as appropriate  - Monitor I&O, WT and lab values  - Obtain nutritional consult as needed  - Evaluate psychosocial factors contributing to over-consumption  Outcome: Progressing     Problem: HEMATOLOGIC - ADULT  Goal: Maintains hematologic stability  Description: INTERVENTIONS  - Assess for signs and symptoms of bleeding or hemorrhage  - Monitor labs and vital signs for trends  - Administer supportive blood products/factors, fluids and medications as ordered and appropriate  - Administer supportive blood products/factors as ordered and appropriate  Outcome: Progressing

## 2022-07-04 NOTE — PLAN OF CARE
Patient resting in bed. VSS. Mild abdominal pain. Will advance to soft diet at 1000. Denies chest/calf pain. POC discussed, all questions and concerns addressed.        Problem: Patient/Family Goals  Goal: Patient/Family Long Term Goal  Description: Patient's Long Term Goal: Discharge home    Interventions:  - Pain tolerable  - Tolerating diet  - Return to previous ADL's  - See additional Care Plan goals for specific interventions  Outcome: Progressing  Goal: Patient/Family Short Term Goal  Description: Patient's Short Term Goal: Prepare for discharge home    Interventions:   - Advance diet as tolerated  - Transition IV to oral pain medication  - Encourage ambulation  - See additional Care Plan goals for specific interventions  Outcome: Progressing     Problem: GASTROINTESTINAL - ADULT  Goal: Minimal or absence of nausea and vomiting  Description: INTERVENTIONS:  - Maintain adequate hydration with IV or PO as ordered and tolerated  - Nasogastric tube to low intermittent suction as ordered  - Evaluate effectiveness of ordered antiemetic medications  - Provide nonpharmacologic comfort measures as appropriate  - Advance diet as tolerated, if ordered  - Obtain nutritional consult as needed  - Evaluate fluid balance  Outcome: Progressing  Goal: Maintains or returns to baseline bowel function  Description: INTERVENTIONS:  - Assess bowel function  - Maintain adequate hydration with IV or PO as ordered and tolerated  - Evaluate effectiveness of GI medications  - Encourage mobilization and activity  - Obtain nutritional consult as needed  - Establish a toileting routine/schedule  - Consider collaborating with pharmacy to review patient's medication profile  Outcome: Progressing  Goal: Maintains adequate nutritional intake (undernourished)  Description: INTERVENTIONS:  - Monitor percentage of each meal consumed  - Identify factors contributing to decreased intake, treat as appropriate  - Assist with meals as needed  - Monitor I&O, WT and lab values  - Obtain nutritional consult as needed  - Optimize oral hygiene and moisture  - Encourage food from home; allow for food preferences  - Enhance eating environment  Outcome: Progressing  Goal: Achieves appropriate nutritional intake (bariatric)  Description: INTERVENTIONS:  - Monitor for over-consumption  - Identify factors contributing to increased intake, treat as appropriate  - Monitor I&O, WT and lab values  - Obtain nutritional consult as needed  - Evaluate psychosocial factors contributing to over-consumption  Outcome: Progressing     Problem: HEMATOLOGIC - ADULT  Goal: Maintains hematologic stability  Description: INTERVENTIONS  - Assess for signs and symptoms of bleeding or hemorrhage  - Monitor labs and vital signs for trends  - Administer supportive blood products/factors, fluids and medications as ordered and appropriate  - Administer supportive blood products/factors as ordered and appropriate  Outcome: Progressing

## 2022-07-04 NOTE — PLAN OF CARE
Patients IV d/c'd ,catheter intact. All discharge instructions explained, all questions answered. Patient will be discharged via wheelchair with support staff.

## 2022-07-05 LAB
CALPROTECTIN, FECAL: 949 UG/G
INTERPRETATION VIT A, SER/PLA: NORMAL
M TB IFN-G CD4+ T-CELLS BLD-ACNC: 0.02 IU/ML
M TB TUBERC IFN-G BLD QL: NEGATIVE
M TB TUBERC IGNF/MITOGEN IGNF CONTROL: 5.87 IU/ML
QFT TB1 AG MINUS NIL: 0 IU/ML
QFT TB2 AG MINUS NIL: 0 IU/ML
RETINYL PALMITATE: <0.02 MG/L
SELENIUM, SERUM: 91.5 UG/L
VITAMIN A (RETINOL): 0.3 MG/L
ZINC SERUM: 63.8 UG/DL

## 2022-07-07 ENCOUNTER — TELEPHONE (OUTPATIENT)
Facility: LOCATION | Age: 37
End: 2022-07-07

## 2022-07-07 LAB
MMA: 0.15 UMOL/L
THIOPURINE METHYLTRANSFERASE: 32.3 U/ML

## 2022-07-07 NOTE — TELEPHONE ENCOUNTER
Pt called to schedule follow up appointment with Dr. Renate Berger after being in the hospital. Insurance is not accepted in this office. Patient informed we would be happy to see her in this office, but could not accept her insurance and it would be self pay. Patient notified it may be in her best interest to call the number on the back of her insurance card to find a General Surgeon/Colorectal Surgeon who is in network for her insurance. She was also told to notify the in network office that she was seen in the hospital and needs to be seen for a follow up. She was also instructed to call [facility] to get copies of her medical records. Patient verbalized understanding and states she wants to try to find a doctor in network for her insurance instead of seeing Dr. Renate Berger at this time. Patient will call if any problems with scheduling an appointment elsewhere.

## 2022-07-08 LAB
ALPHA-TOCOPHEROL (VIT E) -MG/L: 8.3 MG/L
GAMMA-TOCOPHEROL (VIT E) -MG/L: 1.2 MG/L

## 2022-07-09 LAB — VITAMIN K1, SERUM: 0.74 NMOL/L

## 2023-04-18 ENCOUNTER — OFFICE VISIT (OUTPATIENT)
Dept: URGENT CARE | Age: 38
End: 2023-04-18

## 2023-04-18 VITALS
DIASTOLIC BLOOD PRESSURE: 80 MMHG | RESPIRATION RATE: 16 BRPM | TEMPERATURE: 98.3 F | HEART RATE: 99 BPM | OXYGEN SATURATION: 100 % | SYSTOLIC BLOOD PRESSURE: 116 MMHG | HEIGHT: 61 IN | WEIGHT: 150 LBS | BODY MASS INDEX: 28.32 KG/M2

## 2023-04-18 DIAGNOSIS — R30.0 DYSURIA: Primary | ICD-10-CM

## 2023-04-18 DIAGNOSIS — N39.0 URINARY TRACT INFECTION WITH HEMATURIA, SITE UNSPECIFIED: ICD-10-CM

## 2023-04-18 DIAGNOSIS — R31.9 URINARY TRACT INFECTION WITH HEMATURIA, SITE UNSPECIFIED: ICD-10-CM

## 2023-04-18 LAB
APPEARANCE, POC: ABNORMAL
BILIRUBIN, POC: NEGATIVE
COLOR, POC: YELLOW
GLUCOSE UR-MCNC: NEGATIVE MG/DL
KETONES, POC: NEGATIVE MG/DL
NITRITE, POC: NEGATIVE
OCCULT BLOOD, POC: ABNORMAL
PH UR: 6.5 [PH] (ref 5–7)
PROT UR-MCNC: NEGATIVE MG/DL
SP GR UR: 1.01 (ref 1–1.03)
UROBILINOGEN UR-MCNC: NORMAL MG/DL (ref 0–1)
WBC (LEUKOCYTE) ESTERASE, POC: ABNORMAL

## 2023-04-18 PROCEDURE — 87186 SC STD MICRODIL/AGAR DIL: CPT | Performed by: INTERNAL MEDICINE

## 2023-04-18 PROCEDURE — 81002 URINALYSIS NONAUTO W/O SCOPE: CPT | Performed by: NURSE PRACTITIONER

## 2023-04-18 PROCEDURE — 87086 URINE CULTURE/COLONY COUNT: CPT | Performed by: INTERNAL MEDICINE

## 2023-04-18 PROCEDURE — 87088 URINE BACTERIA CULTURE: CPT | Performed by: INTERNAL MEDICINE

## 2023-04-18 PROCEDURE — 99213 OFFICE O/P EST LOW 20 MIN: CPT | Performed by: NURSE PRACTITIONER

## 2023-04-18 RX ORDER — LIDOCAINE 5 G/100G
CREAM RECTAL; TOPICAL
COMMUNITY
Start: 2023-04-07

## 2023-04-18 RX ORDER — NITROFURANTOIN 25; 75 MG/1; MG/1
100 CAPSULE ORAL 2 TIMES DAILY
Qty: 10 CAPSULE | Refills: 0 | Status: SHIPPED | OUTPATIENT
Start: 2023-04-18 | End: 2023-04-23

## 2023-04-18 RX ORDER — HYDROCODONE BITARTRATE AND ACETAMINOPHEN 10; 325 MG/1; MG/1
1 TABLET ORAL EVERY 6 HOURS
COMMUNITY
Start: 2023-04-07

## 2023-04-18 ASSESSMENT — PAIN SCALES - GENERAL: PAINLEVEL: 7

## 2023-04-20 LAB — BACTERIA UR CULT: ABNORMAL

## 2023-06-28 ENCOUNTER — WALK IN (OUTPATIENT)
Dept: URGENT CARE | Age: 38
End: 2023-06-28

## 2023-06-28 VITALS
OXYGEN SATURATION: 100 % | DIASTOLIC BLOOD PRESSURE: 70 MMHG | RESPIRATION RATE: 16 BRPM | SYSTOLIC BLOOD PRESSURE: 123 MMHG | HEART RATE: 88 BPM | TEMPERATURE: 98 F

## 2023-06-28 DIAGNOSIS — R30.0 DYSURIA: Primary | ICD-10-CM

## 2023-06-28 PROBLEM — K50.013 CROHN'S DISEASE OF SMALL INTESTINE WITH FISTULA (CMD): Status: ACTIVE | Noted: 2022-07-02

## 2023-06-28 LAB
APPEARANCE, POC: ABNORMAL
BILIRUBIN, POC: NEGATIVE
COLOR, POC: ABNORMAL
GLUCOSE UR-MCNC: NORMAL MG/DL
KETONES, POC: NEGATIVE MG/DL
NITRITE, POC: NEGATIVE
OCCULT BLOOD, POC: NEGATIVE
PH UR: 6.5 [PH] (ref 5–7)
PROT UR-MCNC: NEGATIVE MG/DL
SP GR UR: 1.01 (ref 1–1.03)
UROBILINOGEN UR-MCNC: 0.2 MG/DL (ref 0–1)
WBC (LEUKOCYTE) ESTERASE, POC: ABNORMAL

## 2023-06-28 PROCEDURE — 87077 CULTURE AEROBIC IDENTIFY: CPT | Performed by: INTERNAL MEDICINE

## 2023-06-28 PROCEDURE — 87086 URINE CULTURE/COLONY COUNT: CPT | Performed by: INTERNAL MEDICINE

## 2023-06-28 PROCEDURE — 99213 OFFICE O/P EST LOW 20 MIN: CPT | Performed by: REGISTERED NURSE

## 2023-06-28 PROCEDURE — 81002 URINALYSIS NONAUTO W/O SCOPE: CPT | Performed by: REGISTERED NURSE

## 2023-06-28 RX ORDER — AZATHIOPRINE 50 MG/1
TABLET ORAL
COMMUNITY
Start: 2023-06-15

## 2023-06-28 RX ORDER — NITROFURANTOIN 25; 75 MG/1; MG/1
100 CAPSULE ORAL 2 TIMES DAILY
Qty: 10 CAPSULE | Refills: 0 | Status: SHIPPED | OUTPATIENT
Start: 2023-06-28 | End: 2023-07-03

## 2023-06-28 ASSESSMENT — ENCOUNTER SYMPTOMS
NEUROLOGICAL NEGATIVE: 1
PSYCHIATRIC NEGATIVE: 1
GASTROINTESTINAL NEGATIVE: 1
CONSTITUTIONAL NEGATIVE: 1

## 2023-06-30 ENCOUNTER — TELEPHONE (OUTPATIENT)
Dept: URGENT CARE | Age: 38
End: 2023-06-30

## 2023-06-30 LAB — BACTERIA UR CULT: ABNORMAL

## 2024-10-18 ENCOUNTER — APPOINTMENT (OUTPATIENT)
Dept: CV DIAGNOSTICS | Facility: HOSPITAL | Age: 39
End: 2024-10-18
Attending: STUDENT IN AN ORGANIZED HEALTH CARE EDUCATION/TRAINING PROGRAM
Payer: MEDICAID

## 2024-10-18 ENCOUNTER — HOSPITAL ENCOUNTER (INPATIENT)
Facility: HOSPITAL | Age: 39
LOS: 2 days | Discharge: HOME | End: 2024-10-20
Attending: EMERGENCY MEDICINE | Admitting: INTERNAL MEDICINE
Payer: MEDICAID

## 2024-10-18 ENCOUNTER — APPOINTMENT (OUTPATIENT)
Dept: CT IMAGING | Age: 39
End: 2024-10-18
Attending: EMERGENCY MEDICINE
Payer: MEDICAID

## 2024-10-18 ENCOUNTER — APPOINTMENT (OUTPATIENT)
Dept: GENERAL RADIOLOGY | Age: 39
End: 2024-10-18
Attending: EMERGENCY MEDICINE
Payer: MEDICAID

## 2024-10-18 ENCOUNTER — APPOINTMENT (OUTPATIENT)
Dept: INTERVENTIONAL RADIOLOGY/VASCULAR | Facility: HOSPITAL | Age: 39
End: 2024-10-18
Payer: MEDICAID

## 2024-10-18 DIAGNOSIS — I25.9 MYOCARDIAL ISCHEMIA: Primary | ICD-10-CM

## 2024-10-18 LAB
ALBUMIN SERPL-MCNC: 3.9 G/DL (ref 3.4–5)
ALBUMIN/GLOB SERPL: 0.9 {RATIO} (ref 1–2)
ALP LIVER SERPL-CCNC: 63 U/L
ALT SERPL-CCNC: 33 U/L
ANION GAP SERPL CALC-SCNC: 4 MMOL/L (ref 0–18)
APTT PPP: 24.5 SECONDS (ref 23–36)
AST SERPL-CCNC: 32 U/L (ref 15–37)
ATRIAL RATE: 66 BPM
ATRIAL RATE: 72 BPM
B-HCG UR QL: NEGATIVE
BASOPHILS # BLD AUTO: 0.04 X10(3) UL (ref 0–0.2)
BASOPHILS NFR BLD AUTO: 0.6 %
BILIRUB SERPL-MCNC: 1.3 MG/DL (ref 0.1–2)
BUN BLD-MCNC: 9 MG/DL (ref 9–23)
CALCIUM BLD-MCNC: 9.3 MG/DL (ref 8.5–10.1)
CHLORIDE SERPL-SCNC: 101 MMOL/L (ref 98–112)
CHOLEST SERPL-MCNC: 215 MG/DL (ref ?–200)
CO2 SERPL-SCNC: 27 MMOL/L (ref 21–32)
CREAT BLD-MCNC: 1.08 MG/DL
CRP SERPL HS-MCNC: 0.99 MG/L (ref ?–3)
EGFRCR SERPLBLD CKD-EPI 2021: 67 ML/MIN/1.73M2 (ref 60–?)
EOSINOPHIL # BLD AUTO: 0.18 X10(3) UL (ref 0–0.7)
EOSINOPHIL NFR BLD AUTO: 2.8 %
ERYTHROCYTE [DISTWIDTH] IN BLOOD BY AUTOMATED COUNT: 12.8 %
ERYTHROCYTE [SEDIMENTATION RATE] IN BLOOD: 17 MM/HR
EST. AVERAGE GLUCOSE BLD GHB EST-MCNC: 111 MG/DL (ref 68–126)
GLOBULIN PLAS-MCNC: 4.3 G/DL (ref 2.8–4.4)
GLUCOSE BLD-MCNC: 186 MG/DL (ref 70–99)
HBA1C MFR BLD: 5.5 % (ref ?–5.7)
HCT VFR BLD AUTO: 45.9 %
HDLC SERPL-MCNC: 46 MG/DL (ref 40–59)
HGB BLD-MCNC: 16.6 G/DL
IMM GRANULOCYTES # BLD AUTO: 0.04 X10(3) UL (ref 0–1)
IMM GRANULOCYTES NFR BLD: 0.6 %
INR BLD: 0.93 (ref 0.8–1.2)
ISTAT ACTIVATED CLOTTING TIME: 201 SECONDS (ref 74–137)
ISTAT ACTIVATED CLOTTING TIME: 299 SECONDS (ref 74–137)
LDLC SERPL CALC-MCNC: 105 MG/DL (ref ?–100)
LYMPHOCYTES # BLD AUTO: 1.48 X10(3) UL (ref 1–4)
LYMPHOCYTES NFR BLD AUTO: 22.7 %
MCH RBC QN AUTO: 32.2 PG (ref 26–34)
MCHC RBC AUTO-ENTMCNC: 36.2 G/DL (ref 31–37)
MCV RBC AUTO: 89 FL
MONOCYTES # BLD AUTO: 0.49 X10(3) UL (ref 0.1–1)
MONOCYTES NFR BLD AUTO: 7.5 %
NEUTROPHILS # BLD AUTO: 4.29 X10 (3) UL (ref 1.5–7.7)
NEUTROPHILS # BLD AUTO: 4.29 X10(3) UL (ref 1.5–7.7)
NEUTROPHILS NFR BLD AUTO: 65.8 %
NONHDLC SERPL-MCNC: 169 MG/DL (ref ?–130)
OSMOLALITY SERPL CALC.SUM OF ELEC: 278 MOSM/KG (ref 275–295)
P AXIS: 27 DEGREES
P AXIS: 9 DEGREES
P-R INTERVAL: 150 MS
P-R INTERVAL: 150 MS
PLATELET # BLD AUTO: 253 10(3)UL (ref 150–450)
POTASSIUM SERPL-SCNC: 3.9 MMOL/L (ref 3.5–5.1)
PROT SERPL-MCNC: 8.2 G/DL (ref 6.4–8.2)
PROTHROMBIN TIME: 12.3 SECONDS (ref 11.6–14.8)
Q-T INTERVAL: 436 MS
Q-T INTERVAL: 462 MS
QRS DURATION: 82 MS
QRS DURATION: 82 MS
QTC CALCULATION (BEZET): 477 MS
QTC CALCULATION (BEZET): 484 MS
R AXIS: 53 DEGREES
R AXIS: 55 DEGREES
RBC # BLD AUTO: 5.16 X10(6)UL
SODIUM SERPL-SCNC: 132 MMOL/L (ref 136–145)
T AXIS: 101 DEGREES
T AXIS: 113 DEGREES
TRIGL SERPL-MCNC: 376 MG/DL (ref 30–149)
TROPONIN I SERPL HS-MCNC: 1266 NG/L
VENTRICULAR RATE: 66 BPM
VENTRICULAR RATE: 72 BPM
VLDLC SERPL CALC-MCNC: 65 MG/DL (ref 0–30)
WBC # BLD AUTO: 6.5 X10(3) UL (ref 4–11)

## 2024-10-18 PROCEDURE — 99223 1ST HOSP IP/OBS HIGH 75: CPT | Performed by: INTERNAL MEDICINE

## 2024-10-18 PROCEDURE — B2111ZZ FLUOROSCOPY OF MULTIPLE CORONARY ARTERIES USING LOW OSMOLAR CONTRAST: ICD-10-PCS | Performed by: INTERNAL MEDICINE

## 2024-10-18 PROCEDURE — B240ZZ3 ULTRASONOGRAPHY OF SINGLE CORONARY ARTERY, INTRAVASCULAR: ICD-10-PCS | Performed by: INTERNAL MEDICINE

## 2024-10-18 PROCEDURE — 027034Z DILATION OF CORONARY ARTERY, ONE ARTERY WITH DRUG-ELUTING INTRALUMINAL DEVICE, PERCUTANEOUS APPROACH: ICD-10-PCS | Performed by: INTERNAL MEDICINE

## 2024-10-18 PROCEDURE — 93306 TTE W/DOPPLER COMPLETE: CPT | Performed by: STUDENT IN AN ORGANIZED HEALTH CARE EDUCATION/TRAINING PROGRAM

## 2024-10-18 PROCEDURE — 71045 X-RAY EXAM CHEST 1 VIEW: CPT | Performed by: EMERGENCY MEDICINE

## 2024-10-18 PROCEDURE — 4A023N7 MEASUREMENT OF CARDIAC SAMPLING AND PRESSURE, LEFT HEART, PERCUTANEOUS APPROACH: ICD-10-PCS | Performed by: INTERNAL MEDICINE

## 2024-10-18 PROCEDURE — 71275 CT ANGIOGRAPHY CHEST: CPT | Performed by: EMERGENCY MEDICINE

## 2024-10-18 RX ORDER — HEPARIN SODIUM 5000 [USP'U]/ML
INJECTION, SOLUTION INTRAVENOUS; SUBCUTANEOUS
Status: DISCONTINUED
Start: 2024-10-18 | End: 2024-10-18 | Stop reason: WASHOUT

## 2024-10-18 RX ORDER — SODIUM CHLORIDE 9 MG/ML
INJECTION, SOLUTION INTRAVENOUS CONTINUOUS
Status: ACTIVE | OUTPATIENT
Start: 2024-10-18 | End: 2024-10-18

## 2024-10-18 RX ORDER — ACETAMINOPHEN 500 MG
500 TABLET ORAL EVERY 4 HOURS PRN
Status: DISCONTINUED | OUTPATIENT
Start: 2024-10-18 | End: 2024-10-18

## 2024-10-18 RX ORDER — SODIUM CHLORIDE 9 MG/ML
INJECTION, SOLUTION INTRAVENOUS
Status: DISCONTINUED | OUTPATIENT
Start: 2024-10-19 | End: 2024-10-18 | Stop reason: HOSPADM

## 2024-10-18 RX ORDER — LIDOCAINE HYDROCHLORIDE 10 MG/ML
INJECTION, SOLUTION EPIDURAL; INFILTRATION; INTRACAUDAL; PERINEURAL
Status: COMPLETED
Start: 2024-10-18 | End: 2024-10-18

## 2024-10-18 RX ORDER — ACETAMINOPHEN 500 MG
500 TABLET ORAL EVERY 4 HOURS PRN
Status: DISCONTINUED | OUTPATIENT
Start: 2024-10-18 | End: 2024-10-20

## 2024-10-18 RX ORDER — HEPARIN SODIUM 1000 [USP'U]/ML
60 INJECTION, SOLUTION INTRAVENOUS; SUBCUTANEOUS ONCE
Status: DISCONTINUED | OUTPATIENT
Start: 2024-10-18 | End: 2024-10-18

## 2024-10-18 RX ORDER — BISACODYL 10 MG
10 SUPPOSITORY, RECTAL RECTAL
Status: DISCONTINUED | OUTPATIENT
Start: 2024-10-18 | End: 2024-10-20

## 2024-10-18 RX ORDER — CARVEDILOL 6.25 MG/1
6.25 TABLET ORAL 2 TIMES DAILY WITH MEALS
Status: DISCONTINUED | OUTPATIENT
Start: 2024-10-18 | End: 2024-10-20

## 2024-10-18 RX ORDER — ASPIRIN 81 MG/1
81 TABLET ORAL DAILY
Status: DISCONTINUED | OUTPATIENT
Start: 2024-10-19 | End: 2024-10-20

## 2024-10-18 RX ORDER — AZATHIOPRINE 50 MG/1
50 TABLET ORAL NIGHTLY
Status: DISCONTINUED | OUTPATIENT
Start: 2024-10-18 | End: 2024-10-20

## 2024-10-18 RX ORDER — VERAPAMIL HYDROCHLORIDE 2.5 MG/ML
INJECTION, SOLUTION INTRAVENOUS
Status: COMPLETED
Start: 2024-10-18 | End: 2024-10-18

## 2024-10-18 RX ORDER — HEPARIN SODIUM AND DEXTROSE 10000; 5 [USP'U]/100ML; G/100ML
12 INJECTION INTRAVENOUS ONCE
Status: DISCONTINUED | OUTPATIENT
Start: 2024-10-18 | End: 2024-10-18

## 2024-10-18 RX ORDER — ASPIRIN 325 MG
325 TABLET, DELAYED RELEASE (ENTERIC COATED) ORAL ONCE
Status: COMPLETED | OUTPATIENT
Start: 2024-10-18 | End: 2024-10-18

## 2024-10-18 RX ORDER — SENNOSIDES 8.6 MG
17.2 TABLET ORAL NIGHTLY PRN
Status: DISCONTINUED | OUTPATIENT
Start: 2024-10-18 | End: 2024-10-20

## 2024-10-18 RX ORDER — POLYETHYLENE GLYCOL 3350 17 G/17G
17 POWDER, FOR SOLUTION ORAL DAILY PRN
Status: DISCONTINUED | OUTPATIENT
Start: 2024-10-18 | End: 2024-10-20

## 2024-10-18 RX ORDER — PROCHLORPERAZINE EDISYLATE 5 MG/ML
5 INJECTION INTRAMUSCULAR; INTRAVENOUS EVERY 8 HOURS PRN
Status: DISCONTINUED | OUTPATIENT
Start: 2024-10-18 | End: 2024-10-20

## 2024-10-18 RX ORDER — ONDANSETRON 2 MG/ML
4 INJECTION INTRAMUSCULAR; INTRAVENOUS EVERY 6 HOURS PRN
Status: DISCONTINUED | OUTPATIENT
Start: 2024-10-18 | End: 2024-10-20

## 2024-10-18 RX ORDER — ATORVASTATIN CALCIUM 40 MG/1
40 TABLET, FILM COATED ORAL NIGHTLY
Status: DISCONTINUED | OUTPATIENT
Start: 2024-10-18 | End: 2024-10-20

## 2024-10-18 RX ORDER — SODIUM PHOSPHATE, DIBASIC AND SODIUM PHOSPHATE, MONOBASIC 7; 19 G/230ML; G/230ML
1 ENEMA RECTAL ONCE AS NEEDED
Status: DISCONTINUED | OUTPATIENT
Start: 2024-10-18 | End: 2024-10-20

## 2024-10-18 RX ORDER — NITROGLYCERIN 20 MG/100ML
INJECTION INTRAVENOUS
Status: COMPLETED
Start: 2024-10-18 | End: 2024-10-18

## 2024-10-18 RX ORDER — PRASUGREL 10 MG/1
TABLET, FILM COATED ORAL
Status: COMPLETED
Start: 2024-10-18 | End: 2024-10-18

## 2024-10-18 RX ORDER — ASPIRIN 81 MG/1
324 TABLET, CHEWABLE ORAL ONCE
Status: DISCONTINUED | OUTPATIENT
Start: 2024-10-18 | End: 2024-10-18 | Stop reason: HOSPADM

## 2024-10-18 RX ORDER — AZATHIOPRINE 50 MG/1
50 TABLET ORAL DAILY
Status: DISCONTINUED | OUTPATIENT
Start: 2024-10-18 | End: 2024-10-18

## 2024-10-18 RX ORDER — MELATONIN
3 NIGHTLY PRN
Status: DISCONTINUED | OUTPATIENT
Start: 2024-10-18 | End: 2024-10-20

## 2024-10-18 RX ORDER — ENOXAPARIN SODIUM 100 MG/ML
40 INJECTION SUBCUTANEOUS DAILY
Status: DISCONTINUED | OUTPATIENT
Start: 2024-10-18 | End: 2024-10-18

## 2024-10-18 RX ORDER — HEPARIN SODIUM AND DEXTROSE 10000; 5 [USP'U]/100ML; G/100ML
INJECTION INTRAVENOUS CONTINUOUS
Status: DISCONTINUED | OUTPATIENT
Start: 2024-10-18 | End: 2024-10-18

## 2024-10-18 RX ORDER — AZATHIOPRINE 50 MG/1
50 TABLET ORAL DAILY
COMMUNITY
Start: 2023-06-15

## 2024-10-18 RX ORDER — MIDAZOLAM HYDROCHLORIDE 1 MG/ML
INJECTION INTRAMUSCULAR; INTRAVENOUS
Status: COMPLETED
Start: 2024-10-18 | End: 2024-10-18

## 2024-10-18 RX ORDER — HEPARIN SODIUM 5000 [USP'U]/ML
INJECTION, SOLUTION INTRAVENOUS; SUBCUTANEOUS
Status: COMPLETED
Start: 2024-10-18 | End: 2024-10-18

## 2024-10-18 NOTE — H&P
[facility]     Rosemarie Blackwell Patient Status:  Inpatient    1985 MRN [id number]   Location [facility] 2NE-A Attending Compa Ponce,    Hosp Day # 0 PCP Caty Pryor MD     Chief Complaint: Chest pain    Subjective:    History of Present Illness:   Rosemarie Blackwell is a 39 year old female with PMHx Crohn's disease on azathioprine and Remicade who presents to the hospital with chest pain that radiates to the back. Symptoms are constant for the past few weeks but they wax and wane in intensity.  There are no exacerbating or alleviating factors.  She does have associated nausea, dyspnea but no vomiting.  She did have an episode of diaphoresis and dizziness this morning and so sought medical attention after her  convinced her.  She denies any recent hospitalizations or travel.  Her father did have an aortic dissection but no other heart disease.  Her maternal uncle passed away from heart disease in his 30s or 40s.  She does not smoke.  She denies any recent cough or congestion.  In the ER, troponin was elevated at 1266.  EKG showed T wave inversions in the anteroseptal leads and slight ST depressions in the lateral leads.  CTA chest was negative for PE and there was no apparent thoracic aortic dissection.  Patient underwent coronary angiogram with PCI to LAD.    History/Other:    Past Medical History:  Past Medical History:    Crohn disease (HCC)    Crohn's colitis (HCC)     Past Surgical History:   Past Surgical History:   Procedure Laterality Date    Cyst removal        Family History:   History reviewed. No pertinent family history.  Social History:    reports that she has never smoked. She has never used smokeless tobacco. She reports that she does not drink alcohol and does not use drugs.     Allergies: Allergies[1]    Medications:  Medications Ordered Prior to Encounter[2]    Review of Systems:   A comprehensive review of systems was completed.    Pertinent  positives and negatives noted in the HPI.    Objective:   Physical Exam:    BP (!) 147/101 (BP Location: Left arm)   Pulse 79   Temp 98 °F (36.7 °C) (Oral)   Resp 14   Ht 5' (1.524 m)   Wt 160 lb 11.5 oz (72.9 kg)   LMP 09/11/2024 (Approximate)   SpO2 95%   BMI 31.39 kg/m²   General: No acute distress, awake and alert  Respiratory: Clear bilaterally, no wheezing  Cardiovascular: S1, S2. Regular rate and rhythm  Abdomen: Soft, Non-tender, non-distended, positive bowel sounds  Neuro: No new focal deficits  Extremities: No edema. Right wrist site c/d/I and no hematoma    Results:    Labs:      Labs Last 24 Hours:  Recent Labs   Lab 10/18/24  0927   RBC 5.16   HGB 16.6*   HCT 45.9   MCV 89.0   MCH 32.2   MCHC 36.2   RDW 12.8   NEPRELIM 4.29   WBC 6.5   .0     Recent Labs   Lab 10/18/24  0927   *   BUN 9   CREATSERUM 1.08*   EGFRCR 67   CA 9.3   ALB 3.9   *   K 3.9      CO2 27.0   ALKPHO 63   AST 32   ALT 33   BILT 1.3   TP 8.2     Lab Results   Component Value Date    INR 0.93 10/18/2024    INR 1.20 07/02/2022     Recent Labs   Lab 10/18/24  0927   TROPHS 1,266*     No results for input(s): \"TROP\", \"PBNP\" in the last 168 hours.    No results for input(s): \"PCT\" in the last 168 hours.    Imaging: Imaging data reviewed in Epic.    Assessment & Plan:      #NSTEMI type I   -S/p coronary angiogram with 100% proximal LAD occlusion s/p IVUS guided PCI on 10/18  -Cardiology consulted  -DAPT, high intensity statin    #Ischemic cardiomyopathy due to above  -EF is 45 to 50%  -Started on Coreg, introduce GDMT as hemodynamics allow    #Hyperlipidemia  -Started on statin    #Hyponatremia  -Monitor, encourage PO intake    #Crohn's disease  -Resume Azathioprine  -On Remicade as outpatient      Plan of care discussed with patient, , RN.    Compa Ponce DO    Supplementary Documentation:     The 21st Century Cures Act makes medical notes like these available to patients in the interest of  transparency. Please be advised this is a medical document. Medical documents are intended to carry relevant information, facts as evident, and the clinical opinion of the practitioner. The medical note is intended as peer to peer communication and may appear blunt or direct. It is written in medical language and may contain abbreviations or verbiage that are unfamiliar.        [1] No Known Allergies  [2]   No current facility-administered medications on file prior to encounter.     Current Outpatient Medications on File Prior to Encounter   Medication Sig Dispense Refill    azaTHIOprine 50 MG Oral Tab Take 1 tablet (50 mg total) by mouth daily.      Ferrous Sulfate 324 (65 Fe) MG Oral Tab EC Take 324 mg by mouth daily.      famotidine 40 MG Oral Tab Take 40 mg by mouth daily. (Patient not taking: Reported on 10/18/2024)

## 2024-10-18 NOTE — ED INITIAL ASSESSMENT (HPI)
Pt reports mid upper chest pain with bilat upper back pain, nausea, lightheadedness and SOB for the past 2 wks.

## 2024-10-18 NOTE — ED QUICK NOTES
Orders for admission, patient is aware of plan and ready to go upstairs. Any questions, please call ED RN kyle at extension [phone].     Patient Covid vaccination status: Fully vaccinated     COVID Test Ordered in ED: None    COVID Suspicion at Admission: N/A    Running Infusions:  None    Mental Status/LOC at time of transport: alert and oriented x4/4    Other pertinent information:   CIWA score: N/A   NIH score:  N/A

## 2024-10-18 NOTE — ED PROVIDER NOTES
Patient Seen in: [facility] In [place]      History     Chief Complaint   Patient presents with    Chest Pain Angina     Stated Complaint: chest and back pain x 2 wks    Subjective:     HPI    39-year-old woman with history of Crohn's disease who  reports experiencing persistent chest pain that occasionally intensifies to the point of causing her to double over, fall, or feel nauseous. The pain is located in the upper chest and radiates to the back. The patient also reports episodes of shortness of breath when the pain is particularly severe. The patient denies any pain in the abdomen. She also denies any recent increase in stress levels. The patient has not sought medical attention for these symptoms before this visit.      Objective:   Past Medical History:    Crohn disease (HCC)    Crohn's colitis (HCC)              Past Surgical History:   Procedure Laterality Date    Cyst removal                  Social History     Socioeconomic History    Marital status:    Tobacco Use    Smoking status: Never    Smokeless tobacco: Never   Vaping Use    Vaping status: Never Used   Substance and Sexual Activity    Alcohol use: Never    Drug use: Never     Social Drivers of Health     Food Insecurity: No Food Insecurity (10/18/2024)    Food Insecurity     Food Insecurity: Never true   Transportation Needs: No Transportation Needs (10/18/2024)    Transportation Needs     Lack of Transportation: No   Housing Stability: Low Risk  (10/18/2024)    Housing Stability     Housing Instability: No              Review of Systems    Positive for stated complaint: chest and back pain x 2 wks  Other systems are as noted in HPI.  Constitutional and vital signs reviewed.      All other systems reviewed and negative except as noted above.    Physical Exam     ED Triage Vitals [10/18/24 0924]   BP (!) 163/106   Pulse 74   Resp 16   Temp 97.7 °F (36.5 °C)   Temp src Oral   SpO2 99 %   O2 Device None (Room air)        Current:BP (!) 145/109 (BP Location: Right arm)   Pulse 76   Temp 98 °F (36.7 °C) (Oral)   Resp 14   Ht 152.4 cm (5')   Wt 72.9 kg   LMP 09/11/2024 (Approximate)   SpO2 97%   BMI 31.39 kg/m²       General:  Vitals as listed.  No acute distress   HEENT: Sclerae anicteric.  Conjunctivae show no pallor.  Oropharynx clear, mucous membranes moist   Lungs: good air exchange and clear   Heart: regular rate rhythm and no murmur   Abdomen: Soft and nontender.  No abdominal masses.  No peritoneal signs   Extremities: no edema, normal peripheral pulses.  No calf tenderness or lower extremity symmetry  Neuro: Alert oriented and nonfocal      ED Course     Labs Reviewed   COMP METABOLIC PANEL (14) - Abnormal; Notable for the following components:       Result Value    Glucose 186 (*)     Sodium 132 (*)     Creatinine 1.08 (*)     A/G Ratio 0.9 (*)     All other components within normal limits   CBC WITH DIFFERENTIAL WITH PLATELET - Abnormal; Notable for the following components:    HGB 16.6 (*)     All other components within normal limits   TROPONIN I HIGH SENSITIVITY - Abnormal; Notable for the following components:    Troponin I (High Sensitivity) 1,266 (*)     All other components within normal limits   LIPID PANEL - Abnormal; Notable for the following components:    Cholesterol, Total 215 (*)     Triglycerides 376 (*)     LDL Cholesterol 105 (*)     VLDL 65 (*)     Non HDL Chol 169 (*)     All other components within normal limits   PROTHROMBIN TIME (PT) - Normal   PTT, ACTIVATED - Normal   HEMOGLOBIN A1C - Normal   C-RP/HIGH SENSITIVITY - Normal   POCT PREGNANCY URINE - Normal   SED RATE, WESTERGREN (AUTOMATED)   RAINBOW DRAW LAVENDER   RAINBOW DRAW LIGHT GREEN   RAINBOW DRAW BLUE     CTA CHEST (CPT=71275)    Result Date: 10/18/2024  CONCLUSION:  Negative for pulmonary embolism or other acute cardiopulmonary process.    LOCATION:  [place] [id number]   Dictated by (CST): Carol Wright MD on 10/18/2024 at 10:30 AM      Finalized by (CST): Carol Wright MD on 10/18/2024 at 10:33 AM       XR CHEST AP PORTABLE  (CPT=71045)    Result Date: 10/18/2024  CONCLUSION:  Borderline heart size. No active disease seen.   LOCATION:  [facility]      Dictated by (CST): Hank Reyes MD on 10/18/2024 at 10:14 AM     Finalized by (CST): Hank Reyes MD on 10/18/2024 at 10:15 AM        I independently read the radiographs and no pulmonary edema on chest x-ray  EKG    Rate, intervals and axes as noted on EKG Report.  Rate: 72  Rhythm: Sinus Rhythm  Reading: Anterolateral T wave inversions.  No old EKG for comparison           ED COURSE and [facility]       Differential diagnosis before testing included atypical chest pain versus thoracic aortic dissection, a medical condition that poses a threat to life.    I reviewed prior external notes including CT of the abdomen pelvis that was done 6/10/2024 and showed mild hydronephrosis    Patient given aspirin.    Case discussed with [facility] hospitalist, Dr. Durbin, notified.    Patient to be admitted to CTU for further intervention by cardiology    I have discussed with the patient the results of testing, differential diagnosis, and treatment plan. They expressed clear understanding of these instructions and agrees to the plan provided.    Disposition and Plan     Clinical Impression:  1. Myocardial ischemia         Disposition:  Admit  10/18/2024 10:09 am    Follow-up:  No follow-up provider specified.      Medications Prescribed:  Current Discharge Medication List

## 2024-10-18 NOTE — CONSULTS
Cardiology Consultation      Rosemarie Blackwell Patient Status:  Inpatient    1985 MRN [id number]   Location [facility] 2NE-A Attending Bozena Durbin MD   Hosp Day # 0 PCP Caty Pryor MD     Reason for Consultation:  Chest pain     History of Present Illness:  Rosemarie Blackwell is a(n) 39 year old female with chronic medical conditions including crohn's disease who presents with complaint of chest discomfort for the past 2 weeks.  Patient describes a tightness in her chest that radiates to her back.  Course seems to wax and wane.  Not consistently worse with exertion.  Patient states she has had associated nausea.  States she is concerned about potentially having a heart attack.  Denies recent viral-like syndrome, headache, visual changes, swelling, fevers, chills, palpitations, syncope.  States her father had history of aortic dissection in his 40s-50s.  Denies premature coronary artery history.  Denies illicit drug use, tobacco use.  Rare EtOH use.    History:  Past Medical History:    Crohn's colitis (HCC)     Past Surgical History:   Procedure Laterality Date    Cyst removal       No family history on file.   reports that she has never smoked. She has never used smokeless tobacco. She reports that she does not drink alcohol and does not use drugs.    Allergies:  Allergies[1]    Medications:  No current facility-administered medications for this encounter.    Review of Systems:  A comprehensive review of systems was negative if not otherwise mention in above HPI.    BP (!) 151/104 (BP Location: Right arm)   Pulse 77   Temp 97 °F (36.1 °C)   Resp 14   Ht 5' (1.524 m)   Wt 160 lb (72.6 kg)   LMP 2024 (Approximate)   SpO2 98%   BMI 31.25 kg/m²   Temp (24hrs), Av.4 °F (36.3 °C), Min:97 °F (36.1 °C), Max:97.7 °F (36.5 °C)     No intake or output data in the 24 hours ending 10/18/24 1233  Wt Readings from Last 3 Encounters:   10/18/24 160 lb (72.6 kg)   22 140 lb (63.5 kg)        Physical Exam:    General: Alert and oriented x 3. No apparent distress. No respiratory or constitutional distress.  HEENT: Normocephalic, anicteric sclera, neck supple.  Neck: No JVD  Cardiac: Regular rate and rhythm. S1, S2 normal. No murmur, pericardial rub, S3.  Lungs: Clear without wheezes, rales, rhonchi or dullness.  Normal excursions and effort.  Abdomen: Soft, non-tender. BS-present.  Extremities: Without clubbing, cyanosis or edema.    Neurologic: Alert and oriented, normal affect.  Skin: Warm and dry.     Laboratory Data:  Lab Results   Component Value Date    WBC 6.5 10/18/2024    HGB 16.6 10/18/2024    HCT 45.9 10/18/2024    .0 10/18/2024    CREATSERUM 1.08 10/18/2024    BUN 9 10/18/2024     10/18/2024    K 3.9 10/18/2024     10/18/2024    CO2 27.0 10/18/2024     10/18/2024    CA 9.3 10/18/2024    ALB 3.9 10/18/2024    ALKPHO 63 10/18/2024    BILT 1.3 10/18/2024    TP 8.2 10/18/2024    AST 32 10/18/2024    ALT 33 10/18/2024    PTT 24.5 10/18/2024    INR 0.93 10/18/2024    PTP 12.3 10/18/2024       Imaging/results:  EKG -sinus rhythm, T WI anteroseptal leads, slight ST depression lateral leads  Troponin 1266  CTA chest negative for PE.  No apparent thoracic aorta dissection.  No significant pulmonary edema or pericardial effusion or coronary calcification.      Assessment:  Chest pain, possible NSTEMI - scad, myocarditis in the differential  HLD  Crohn's disease      Plan:  Recommend left heart catheterization for definitive coronary evaluation.    Repeat EKG.  Patient was consented for left heart cardiac catheterization. The risks and benefits of the procedure were explained to the patient. 1-2% risk of coronary angiography, possible angioplasty, include, but are not limited to stroke, death, heart attack, coronary dissection requiring emergent CABG, hematoma, bleeding, allergic reaction to medications, vascular damage requiring surgical repair, kidney failure requiring  dialysis and others. Patient wishes to proceed.  Echo  ESR/CRP, lipid panel   Further recs to follow         Thank you for allowing me to participate in the care of your patient.      Bry Ponce DO  Cardiologist  [facility] [place]  10/18/2024 12:33 PM      Note to the patient: The 21st Century Cures Act makes medical notes like these available to patients in the interest of transparency. However, be advised that this is a medical document. It is intended as peer to peer communication. It is written in medical language and may contain abbreviations or verbiage that are unfamiliar. It may appear blunt or direct. Medical documents are intended to carry relevant information, facts as evident, and clinical opinion of the practitioner.     Disclaimer: Components of this note were documented using voice recognition system and are subject to errors not corrected at proofreading. Contact the author of this note for any clarifications.          [1] No Known Allergies

## 2024-10-18 NOTE — PLAN OF CARE
Patient admitted from [facility] ED; admission assessment and navigator completed in EMR; patient alert and oriented, lives with her , son and mother- is independent in all ADLs and denies any home health services; VSS; cardiac monitor showing SR; lung sounds clear, on room air, no cough noted; has pain to center chest that radiates to mid upper back that she rated 3/10, and EKG done per Dr Ponce; no edema; continent of bowel and bladder with LBM this morning; skin exam done, has small scratch to left wrist from prior to admission; patient ambulates independently with a steady gait; patient updated her family about going for cath, patient currently in cath; after getting call from cath lab that they were going to  patient, saw orders for ptt and heparin- messaged JENNIFER Faye to inform patient leaving for cath lab.     1615: Patient back from cath lab, her hand was starting to swell. Cath Lab staff tried to remove air from the TR band but started to bleed so air reinserted- arm elevated on a pillow.    1710: Blood pressure elevated, messaged DEBBIE Izquierdo to inform. Carvedilol administered as ordered.    1830:  Patient urinated 50 ml at 1740, she has not had much to drink today.  Bladder scan showed 262 ml    2000: During bedside report, final 2 ml of air was removed from patient's TR band. Site is not bleeding, there is old blood from when the cath tried to remove some air earlier. Patient tolerated recovery well, no complaints. Recovery ends at 2015, oncoming RN informed.     Problem: CARDIOVASCULAR - ADULT  Goal: Maintains optimal cardiac output and hemodynamic stability  Description: INTERVENTIONS:  - Monitor vital signs, rhythm, and trends  - Monitor for bleeding, hypotension and signs of decreased cardiac output  - Evaluate effectiveness of vasoactive medications to optimize hemodynamic stability  - Monitor arterial and/or venous puncture sites for bleeding and/or hematoma  - Assess quality of pulses,  skin color and temperature  - Assess for signs of decreased coronary artery perfusion - ex. Angina  - Evaluate fluid balance, assess for edema, trend weights  Outcome: Progressing  Goal: Absence of cardiac arrhythmias or at baseline  Description: INTERVENTIONS:  - Continuous cardiac monitoring, monitor vital signs, obtain 12 lead EKG if indicated  - Evaluate effectiveness of antiarrhythmic and heart rate control medications as ordered  - Initiate emergency measures for life threatening arrhythmias  - Monitor electrolytes and administer replacement therapy as ordered  Outcome: Progressing

## 2024-10-18 NOTE — DIETARY NOTE
Clinical Nutrition    Dietitian consult received per cardiac rehab standing order. Pt to be educated by cardiac rehab staff and encouraged to attend outpatient classes taught by RD. RD available PRN.    Estefani Nunn MS, RD, LDN  Clinical Dietitian  Ext: [phone]

## 2024-10-18 NOTE — PROCEDURES
[facility]   part of [facility]    Cardiac Catheterization Note    Primary Proceduralist: Shelton Ma MD  Procedure Performed: LHC, LAD IVUS, LAD PCI  Date of Procedure: 10/18/2024   Indication: NSTEMI  Pre Operative Diagnosis: NSTEMI  Post Operative Diagnosis: NSTEMI  Estimated blood loss: 10cc  Specimens: None    Consent obtained from the patient and documented in the paper chart, unless verbally obtained in an emergency setting.  The benefits and risk of the procedure, including but not limited to infection, bleeding, myocardial infarction, stroke, vascular injury, emergency surgery, renal failure requiring dialysis and death were discussed with the patient. These complications occur in approximately 1-2% of elective procedures, but the risk may be significantly elevated in the setting of acute coronary syndrome, electrical or hemodynamic instability, multivessel disease, reduced LVEF or . The patient consented to any additional procedures performed emergently in order to address a complication or prevent medical deterioration. Viable alternatives were explained to the patient, including continuing medical therapy, with the risks incurred along that course.      Procedure/Technique:    Lidocaine 1% was administered locally. Access of right radial artery obtained using Seldinger technique. Ultrasound was not used.     6 Fr Sheath was inserted. J-wire advanced into the aorta under fluoroscopy.    Left coronary system engaged using 6 Fr XB 3.0 guide. Selective angiogram performed.     Right coronary system engaged using 6 FR TIG.  Selective angiogram performed.  Catheter advanced into the LV. Hemodynamics were obtained.    Coronary Angiogram Findings:  LM: Large caliber artery giving rise to LAD & LCX. No significant stenosis.  LAD: Large caliber artery giving rise to diagonal and septal branches. 100% proximal occlusion.  LCX: Medium to large caliber artery giving rise to  OM branches. No significant stenosis.  RCA: Large caliber artery giving rise to acute marginal branches, and bifurcates into RPDA & RPL. No significant stenosis.    Hemodynamics:  /10, LVEDP 16  /100, mean 122  No significant gradient upon Ao-LV pullback        Intervention:  6 Fr EBU 3.0 Guide used to engage LM  Pop Blue crossed proximal LAD lesion  IVUS showed soft plaque  PTCA using 2.5 x 15mm cutting balloon  LENCHO 3.0 x 20mm LENCHO to pLAD  P.E using 3.5 NC up to 16 alex  IVUS showed underexpanded stent  P.E using 4.0 x 12mm NC up to 16 alex  Angiogram showed AURA III flow (lesion 100% > 0%)    Monitored sedation administered by the cath lab RN, and supervised throughout the procedure by myself. Total time 65 minutes.     Closure: Radial band.    No immediate complications.    A/P:  100% proximal LAD occlusion s/p IVUS guided PCI  DAPT  GDMT for ICM/HFrEF  CTU       Shelton Ma MD  Interventional Cardiology  [facility]

## 2024-10-19 PROBLEM — E78.5 HYPERLIPIDEMIA: Status: ACTIVE | Noted: 2024-10-19

## 2024-10-19 PROBLEM — I25.5 ISCHEMIC CARDIOMYOPATHY: Status: ACTIVE | Noted: 2024-10-19

## 2024-10-19 LAB
ALBUMIN SERPL-MCNC: 4.4 G/DL (ref 3.2–4.8)
ALBUMIN/GLOB SERPL: 1.5 {RATIO} (ref 1–2)
ALP LIVER SERPL-CCNC: 52 U/L
ALT SERPL-CCNC: 24 U/L
ANION GAP SERPL CALC-SCNC: 8 MMOL/L (ref 0–18)
AST SERPL-CCNC: 47 U/L (ref ?–34)
ATRIAL RATE: 76 BPM
ATRIAL RATE: 91 BPM
BILIRUB SERPL-MCNC: 1.4 MG/DL (ref 0.3–1.2)
BUN BLD-MCNC: 6 MG/DL (ref 9–23)
CALCIUM BLD-MCNC: 9.6 MG/DL (ref 8.7–10.4)
CHLORIDE SERPL-SCNC: 105 MMOL/L (ref 98–112)
CO2 SERPL-SCNC: 23 MMOL/L (ref 21–32)
CREAT BLD-MCNC: 0.98 MG/DL
EGFRCR SERPLBLD CKD-EPI 2021: 75 ML/MIN/1.73M2 (ref 60–?)
ERYTHROCYTE [DISTWIDTH] IN BLOOD BY AUTOMATED COUNT: 12.7 %
GLOBULIN PLAS-MCNC: 3 G/DL (ref 2–3.5)
GLUCOSE BLD-MCNC: 135 MG/DL (ref 70–99)
HCT VFR BLD AUTO: 42.7 %
HGB BLD-MCNC: 15 G/DL
MAGNESIUM SERPL-MCNC: 2 MG/DL (ref 1.6–2.6)
MCH RBC QN AUTO: 31 PG (ref 26–34)
MCHC RBC AUTO-ENTMCNC: 35.1 G/DL (ref 31–37)
MCV RBC AUTO: 88.2 FL
OSMOLALITY SERPL CALC.SUM OF ELEC: 282 MOSM/KG (ref 275–295)
P AXIS: 11 DEGREES
P-R INTERVAL: 156 MS
P-R INTERVAL: 156 MS
PHOSPHATE SERPL-MCNC: 2.3 MG/DL (ref 2.4–5.1)
PLATELET # BLD AUTO: 224 10(3)UL (ref 150–450)
POTASSIUM SERPL-SCNC: 4.2 MMOL/L (ref 3.5–5.1)
PROT SERPL-MCNC: 7.4 G/DL (ref 5.7–8.2)
Q-T INTERVAL: 416 MS
Q-T INTERVAL: 432 MS
QRS DURATION: 70 MS
QRS DURATION: 82 MS
QTC CALCULATION (BEZET): 486 MS
QTC CALCULATION (BEZET): 511 MS
R AXIS: 144 DEGREES
R AXIS: 86 DEGREES
RBC # BLD AUTO: 4.84 X10(6)UL
SODIUM SERPL-SCNC: 136 MMOL/L (ref 136–145)
T AXIS: 120 DEGREES
T AXIS: 69 DEGREES
TROPONIN I SERPL HS-MCNC: 3717 NG/L
VENTRICULAR RATE: 76 BPM
VENTRICULAR RATE: 91 BPM
WBC # BLD AUTO: 7.9 X10(3) UL (ref 4–11)

## 2024-10-19 PROCEDURE — 99232 SBSQ HOSP IP/OBS MODERATE 35: CPT | Performed by: INTERNAL MEDICINE

## 2024-10-19 RX ORDER — LOSARTAN POTASSIUM 25 MG/1
25 TABLET ORAL DAILY
Status: DISCONTINUED | OUTPATIENT
Start: 2024-10-19 | End: 2024-10-20

## 2024-10-19 NOTE — PLAN OF CARE
Patient is A/O x4. Patient is on room air. Patient is NSR and blood pressure are normal. Losartan was started today.      Patient didn't report any pain during my shift. Cardiac rehab spoke to patient and informed the patient that the stent card will be sent in the mail.      Patient is standby assist and uses the bathroom to void. Plan for discharge tomorrow                        Problem: CARDIOVASCULAR - ADULT  Goal: Maintains optimal cardiac output and hemodynamic stability  Description: INTERVENTIONS:  - Monitor vital signs, rhythm, and trends  - Monitor for bleeding, hypotension and signs of decreased cardiac output  - Evaluate effectiveness of vasoactive medications to optimize hemodynamic stability  - Monitor arterial and/or venous puncture sites for bleeding and/or hematoma  - Assess quality of pulses, skin color and temperature  - Assess for signs of decreased coronary artery perfusion - ex. Angina  - Evaluate fluid balance, assess for edema, trend weights  Outcome: Progressing  Goal: Absence of cardiac arrhythmias or at baseline  Description: INTERVENTIONS:  - Continuous cardiac monitoring, monitor vital signs, obtain 12 lead EKG if indicated  - Evaluate effectiveness of antiarrhythmic and heart rate control medications as ordered  - Initiate emergency measures for life threatening arrhythmias  - Monitor electrolytes and administer replacement therapy as ordered  Outcome: Progressing

## 2024-10-19 NOTE — PROGRESS NOTES
[facility]   part of [facility]     Hospitalist Progress Note     Rosemarie Blackwell Patient Status:  Inpatient    1985 MRN [id number]   Location [facility] 2NE-A Attending Buffy Payne MD   Hosp Day # 1 PCP Caty Pryor MD     Chief Complaint: CAD     Subjective:     Patient with no CP, SOB, VALVERDE.     Objective:    Review of Systems:   A comprehensive review of systems was completed; pertinent positive and negatives stated in subjective.    Vital signs:  Temp:  [97 °F (36.1 °C)-98.8 °F (37.1 °C)] 98.8 °F (37.1 °C)  Pulse:  [69-85] 71  Resp:  [12-16] 15  BP: (129-164)/() 129/93  SpO2:  [95 %-100 %] 97 %    Physical Exam:    General: No acute distress  Respiratory: No wheezes, no rhonchi  Cardiovascular: S1, S2, regular rate and rhythm  Abdomen: Soft, Non-tender, non-distended, positive bowel sounds  Neuro: No new focal deficits.   Extremities: No edema      Diagnostic Data:    Labs:  Recent Labs   Lab 10/18/24  0927 10/19/24  0554   WBC 6.5 7.9   HGB 16.6* 15.0   MCV 89.0 88.2   .0 224.0   INR 0.93  --        Recent Labs   Lab 10/18/24  0927 10/19/24  0554   * 135*   BUN 9 6*   CREATSERUM 1.08* 0.98   CA 9.3 9.6   ALB 3.9 4.4   * 136   K 3.9 4.2    105   CO2 27.0 23.0   ALKPHO 63 52   AST 32 47*   ALT 33 24   BILT 1.3 1.4*   TP 8.2 7.4       Estimated Creatinine Clearance: 54.8 mL/min (based on SCr of 0.98 mg/dL).    Recent Labs   Lab 10/18/24  0927   TROPHS 1,266*       Recent Labs   Lab 10/18/24  0927   PTP 12.3   INR 0.93                  Microbiology    No results found for this visit on 10/18/24.      Imaging: Reviewed in Epic.    Medications:    ticagrelor  90 mg Oral Q12H    aspirin  81 mg Oral Daily    carvedilol  6.25 mg Oral BID with meals    atorvastatin  40 mg Oral Nightly    azaTHIOprine  50 mg Oral Nightly       Assessment & Plan:      #NSTEMI   -S/p coronary angiogram with 100% proximal LAD occlusion s/p IVUS guided PCI on 10/18  -Cardiology rec  appreciated   -DAPT, high intensity statin, BB     #Ischemic cardiomyopathy due to above  -EF is 45 to 50%  -on Coreg,ASA, statin      #Hyperlipidemia  - on statin     # hyperglycemia , check HgA1c    #Hyponatremia, resolved   -Monitor, encourage PO intake     #Crohn's disease  - Azathioprine  -On Remicade as outpatient    Dc when cleared per cards    Buffy Payne MD    Supplementary Documentation:     Quality:  DVT Mechanical Prophylaxis:        DVT Pharmacologic Prophylaxis   Medication   None         DVT Pharmacologic prophylaxis: Aspirin 81 mg      Code Status: Not on file  Sweet: No urinary catheter in place  Sweet Duration (in days):   Central line:    MAGNO: 10/19/2024    Discharge is dependent on: course  At this point Ms. Blackwell is expected to be discharge to: home     The 21st Century Cures Act makes medical notes like these available to patients in the interest of transparency. Please be advised this is a medical document. Medical documents are intended to carry relevant information, facts as evident, and the clinical opinion of the practitioner. The medical note is intended as peer to peer communication and may appear blunt or direct. It is written in medical language and may contain abbreviations or verbiage that are unfamiliar.

## 2024-10-19 NOTE — PLAN OF CARE
Received patient awake and oriented. On room air, breath sounds clear. On tele, SR. Dressing to R wrist removed by charge nurse, no bleeding noted. No c/o pain voiced. Up to the bathroom independently, steady on feet.

## 2024-10-19 NOTE — DISCHARGE INSTRUCTIONS
Your orientation appointment for cardiac rehabilitation is Thursday Nov 21, 2024 at 10:00am . (Your initial appointment will be approx 1 hour) Please obtain an order for cardiac rehabilitation from your cardiologist. Stress test to be done before appointment or waived by cardiologist. Patient to confirm insurance coverage for cardiac rehab. Diabetic patients should bring glucometer.  Please wear comfortable clothing, exercise shoes, and glasses if needed. If you have questions about cardiac rehabilitation please call [phone].

## 2024-10-19 NOTE — PROGRESS NOTES
Progress Note  Rosemarie Blackwell Patient Status:  Inpatient    1985 MRN [id number]   Location [facility] 2NE-A Attending Buffy Payne MD   Hosp Day # 1 PCP Caty Pryor MD     Subjective:  Doing well this morning. Denies chest pain. Denies shortness of breath. No cardiac concerns from overnight.    Objective:  BP (!) 142/99 (BP Location: Left arm)   Pulse 83   Temp 98.8 °F (37.1 °C) (Oral)   Resp 15   Ht 5' (1.524 m)   Wt 160 lb 11.5 oz (72.9 kg)   LMP 2024 (Approximate)   SpO2 97%   BMI 31.39 kg/m²     Intake/Output:    Intake/Output Summary (Last 24 hours) at 10/19/2024 1129  Last data filed at 10/19/2024 0900  Gross per 24 hour   Intake 440 ml   Output 50 ml   Net 390 ml       Last 3 Weights   10/18/24 1233 160 lb 11.5 oz (72.9 kg)   10/18/24 0924 160 lb (72.6 kg)   22 0414 140 lb (63.5 kg)   22 0409 140 lb (63.5 kg)       Labs:  Recent Labs   Lab 10/18/24  0927 10/19/24  0554   * 135*   BUN 9 6*   CREATSERUM 1.08* 0.98   EGFRCR 67 75   CA 9.3 9.6   * 136   K 3.9 4.2    105   CO2 27.0 23.0     Recent Labs   Lab 10/18/24  0927 10/19/24  0554   RBC 5.16 4.84   HGB 16.6* 15.0   HCT 45.9 42.7   MCV 89.0 88.2   MCH 32.2 31.0   MCHC 36.2 35.1   RDW 12.8 12.7   NEPRELIM 4.29  --    WBC 6.5 7.9   .0 224.0         Recent Labs   Lab 10/18/24  0927   TROPHS 1,266*       Diagnostics:   Telemetry: NSR    TTE 10/18:  Left ventricle:  The cavity size was normal. Wall thickness was at the upper   limits of normal. Systolic function was mildly reduced. The estimated   ejection fraction was 45-50%, by visual assessment.   Regional wall motion:  There is severe hypokinesis of the mid-apical   anteroseptal, apical anterior, and apical septal walls and the apex. Doppler   parameters are consistent with abnormal left ventricular relaxation - grade   1 diastolic dysfunction.   Left atrium:  The left atrial volume was normal.   Right ventricle:  The cavity size was  normal. Systolic function was normal.   Right atrium:  The atrium was normal in size.   Mitral valve:  The valve was structurally normal. Leaflet separation was   normal.  Doppler:  Transvalvular velocity was within the normal range. There   was no evidence for stenosis. There was no significant regurgitation.   Aortic valve:  The valve was structurally normal. The valve was trileaflet.   Cusp separation was normal.  Doppler:  Transvalvular velocity was within the   normal range. There was no evidence for stenosis. There was no significant   regurgitation.   Tricuspid valve:  The valve is structurally normal. Leaflet separation was   normal.  Doppler:  Transvalvular velocity was within the normal range. There   was no evidence for stenosis. There was trivial regurgitation.   Pulmonic valve:   The valve is structurally normal. Cusp separation was   normal.  Doppler:  Transvalvular velocity was within the normal range. There   was no evidence for stenosis. There was trivial regurgitation.   Pericardium:   There was no pericardial effusion.   Aorta:   Aortic root: The aortic root was normal.   Ascending aorta: The ascending aorta was normal.   Pulmonary arteries:   Systolic pressure was within the normal range, estimated to be 22mm Hg.   Systemic veins:  Central venous respirophasic diameter changes are in the   normal range (>50%).   Inferior vena cava: The IVC was normal-sized.     Cath 10/18:  Hemodynamics:  /10, LVEDP 16  /100, mean 122  No significant gradient upon Ao-LV pullback  Intervention:  6 Fr EBU 3.0 Guide used to engage LM  Pop Blue crossed proximal LAD lesion  IVUS showed soft plaque  PTCA using 2.5 x 15mm cutting balloon  LENCHO 3.0 x 20mm LENCHO to pLAD  P.E using 3.5 NC up to 16 alex  IVUS showed underexpanded stent  P.E using 4.0 x 12mm NC up to 16 alex  Angiogram showed AURA III flow (lesion 100% > 0%)  Closure: Radial band.     No immediate complications.    A/P:  100% proximal LAD occlusion  s/p IVUS guided PCI  DAPT  GDMT for ICM/HFrEF  CTU     Review of Systems   Cardiovascular:  Negative for chest pain.   Respiratory:  Negative for shortness of breath.        Physical Exam:  General: Alert and oriented in no apparent distress.  HEENT: Pupils equal. Mucous membranes moist.   Neck: No JVD  Cardiac:  Normal S1 S2, Regular. No murmur  Lungs: Clear without wheezes or crackles    Abdomen: Soft, non-tender, ND  Extremities: Without clubbing, cyanosis or edema.    Neurologic: No focal deficits. Normal affect.  Skin: Warm and dry, radial site without hematoma    Medications:   sodium phosphate  15 mmol Intravenous Once    ticagrelor  90 mg Oral Q12H    aspirin  81 mg Oral Daily    carvedilol  6.25 mg Oral BID with meals    atorvastatin  40 mg Oral Nightly    azaTHIOprine  50 mg Oral Nightly         Assessment:    NSTEMI, CAD s/p PCI with LENCHO to proximal LAD 10/18/24  Presented with chest pain. Chest pain has now resolved. HsTrop 1266  Cardiac cath 10/18 as above, PCI to 100% proximal LAD occlusion. No significant stenosis elsewhere.  ASA 81 mg daily and Brilinta 90 mg BID. Coreg. High intensity statin.  Ischemic cardiomyopathy, LVEF 45-50%  Compensated, euvolemic  TTE 10/18 with mildly reduced LVEF in the setting of 100% LAD lesion which has been revascularized  GDMT: Coreg. Start Losartan 25 mg daily. Could consider eventual MRA/SGLT2.  Crohn's disease  Hyperlipidemia - statin  HTN - BP mildly elevated, starting losartan. Goal <130/80.    Plan:    Continue DAPT and discussed importance of strict compliance with ASA/Brilinta. BB. Statin. Starting losartan 25 mg daily today for GDMT with mildly reduced LVEF/ICM.  Likely discharge tomorrow from cardiac standpoint.      Plan of care discussed with patient, RN.    DEBBIE Joe  10/19/2024  11:29 AM    Patient seen and examined    Patient presented with an anterior MI.  Case was discussed with her.  LV function is mildly abnormal.  Of asked for another EKG to  be checked.  Will continue with DAPT statin and beta-blockers.  She is also on losartan.  Ideally would like for her to stay in house till tomorrow.    Chico Garber MD FACC  L3

## 2024-10-20 VITALS
OXYGEN SATURATION: 95 % | BODY MASS INDEX: 31.55 KG/M2 | TEMPERATURE: 98 F | RESPIRATION RATE: 17 BRPM | WEIGHT: 160.69 LBS | SYSTOLIC BLOOD PRESSURE: 103 MMHG | DIASTOLIC BLOOD PRESSURE: 76 MMHG | HEART RATE: 85 BPM | HEIGHT: 60 IN

## 2024-10-20 LAB
ANION GAP SERPL CALC-SCNC: 4 MMOL/L (ref 0–18)
BUN BLD-MCNC: 8 MG/DL (ref 9–23)
CALCIUM BLD-MCNC: 9.5 MG/DL (ref 8.7–10.4)
CHLORIDE SERPL-SCNC: 107 MMOL/L (ref 98–112)
CO2 SERPL-SCNC: 26 MMOL/L (ref 21–32)
CREAT BLD-MCNC: 0.97 MG/DL
EGFRCR SERPLBLD CKD-EPI 2021: 76 ML/MIN/1.73M2 (ref 60–?)
GLUCOSE BLD-MCNC: 120 MG/DL (ref 70–99)
OSMOLALITY SERPL CALC.SUM OF ELEC: 284 MOSM/KG (ref 275–295)
PHOSPHATE SERPL-MCNC: 2.4 MG/DL (ref 2.4–5.1)
POTASSIUM SERPL-SCNC: 4.2 MMOL/L (ref 3.5–5.1)
SODIUM SERPL-SCNC: 137 MMOL/L (ref 136–145)

## 2024-10-20 PROCEDURE — 99239 HOSP IP/OBS DSCHRG MGMT >30: CPT | Performed by: INTERNAL MEDICINE

## 2024-10-20 RX ORDER — LOSARTAN POTASSIUM 25 MG/1
25 TABLET ORAL DAILY
Qty: 30 TABLET | Refills: 1 | Status: SHIPPED | OUTPATIENT
Start: 2024-10-21

## 2024-10-20 RX ORDER — ASPIRIN 81 MG/1
81 TABLET ORAL DAILY
Qty: 90 TABLET | Refills: 3 | Status: SHIPPED | OUTPATIENT
Start: 2024-10-21

## 2024-10-20 RX ORDER — ATORVASTATIN CALCIUM 40 MG/1
40 TABLET, FILM COATED ORAL NIGHTLY
Qty: 30 TABLET | Refills: 2 | Status: SHIPPED | OUTPATIENT
Start: 2024-10-20

## 2024-10-20 RX ORDER — CARVEDILOL 6.25 MG/1
6.25 TABLET ORAL 2 TIMES DAILY WITH MEALS
Qty: 60 TABLET | Refills: 1 | Status: SHIPPED | OUTPATIENT
Start: 2024-10-20

## 2024-10-20 NOTE — PLAN OF CARE
Alert and o x 4 , S/P PCI to LAD , R wrist KORIN , denies any pain or discomfort. Tele shows SR on the monitor. Encouraged to increase activity , poss DC am. Cont. Monitor per tele/labs/v/s. Meds as ordered. Instructed to call staff when needed help , placed call light w/in reach.    Problem: CARDIOVASCULAR - ADULT  Goal: Maintains optimal cardiac output and hemodynamic stability  Description: INTERVENTIONS:  - Monitor vital signs, rhythm, and trends  - Monitor for bleeding, hypotension and signs of decreased cardiac output  - Evaluate effectiveness of vasoactive medications to optimize hemodynamic stability  - Monitor arterial and/or venous puncture sites for bleeding and/or hematoma  - Assess quality of pulses, skin color and temperature  - Assess for signs of decreased coronary artery perfusion - ex. Angina  - Evaluate fluid balance, assess for edema, trend weights  Outcome: Progressing  Goal: Absence of cardiac arrhythmias or at baseline  Description: INTERVENTIONS:  - Continuous cardiac monitoring, monitor vital signs, obtain 12 lead EKG if indicated  - Evaluate effectiveness of antiarrhythmic and heart rate control medications as ordered  - Initiate emergency measures for life threatening arrhythmias  - Monitor electrolytes and administer replacement therapy as ordered  Outcome: Progressing

## 2024-10-20 NOTE — PROGRESS NOTES
Progress Note  Rosemarie Blackwell Patient Status:  Inpatient    1985 MRN [id number]   Location [facility] 2NE-A Attending Buffy Payne MD   Hosp Day # 2 PCP Caty Pryor MD     Subjective:  Doing well this morning. Just finishing ambulating the halls without VALVERDE or chest discomfort. Denies chest pain. Denies shortness of breath. No cardiac concerns from overnight. Feeling ready to go home.     Objective:  /77 (BP Location: Right arm)   Pulse 70   Temp 98.6 °F (37 °C) (Oral)   Resp 18   Ht 5' (1.524 m)   Wt 160 lb 11.5 oz (72.9 kg)   LMP 2024 (Approximate)   SpO2 95%   BMI 31.39 kg/m²     Intake/Output:    Intake/Output Summary (Last 24 hours) at 10/20/2024 0832  Last data filed at 10/20/2024 0821  Gross per 24 hour   Intake --   Output 1000 ml   Net -1000 ml       Last 3 Weights   10/18/24 1233 160 lb 11.5 oz (72.9 kg)   10/18/24 0924 160 lb (72.6 kg)   22 0414 140 lb (63.5 kg)   22 0409 140 lb (63.5 kg)       Labs:  Recent Labs   Lab 10/18/24  0927 10/19/24  0554 10/20/24  0607   * 135* 120*   BUN 9 6* 8*   CREATSERUM 1.08* 0.98 0.97   EGFRCR 67 75 76   CA 9.3 9.6 9.5   * 136 137   K 3.9 4.2 4.2    105 107   CO2 27.0 23.0 26.0     Recent Labs   Lab 10/18/24  0927 10/19/24  0554   RBC 5.16 4.84   HGB 16.6* 15.0   HCT 45.9 42.7   MCV 89.0 88.2   MCH 32.2 31.0   MCHC 36.2 35.1   RDW 12.8 12.7   NEPRELIM 4.29  --    WBC 6.5 7.9   .0 224.0         Recent Labs   Lab 10/18/24  0927 10/19/24  1215   TROPHS 1,266* 3,717*       Diagnostics:   Telemetry: NSR    TTE 10/18:  Left ventricle:  The cavity size was normal. Wall thickness was at the upper   limits of normal. Systolic function was mildly reduced. The estimated   ejection fraction was 45-50%, by visual assessment.   Regional wall motion:  There is severe hypokinesis of the mid-apical   anteroseptal, apical anterior, and apical septal walls and the apex. Doppler   parameters are consistent with  abnormal left ventricular relaxation - grade   1 diastolic dysfunction.   Left atrium:  The left atrial volume was normal.   Right ventricle:  The cavity size was normal. Systolic function was normal.   Right atrium:  The atrium was normal in size.   Mitral valve:  The valve was structurally normal. Leaflet separation was   normal.  Doppler:  Transvalvular velocity was within the normal range. There   was no evidence for stenosis. There was no significant regurgitation.   Aortic valve:  The valve was structurally normal. The valve was trileaflet.   Cusp separation was normal.  Doppler:  Transvalvular velocity was within the   normal range. There was no evidence for stenosis. There was no significant   regurgitation.   Tricuspid valve:  The valve is structurally normal. Leaflet separation was   normal.  Doppler:  Transvalvular velocity was within the normal range. There   was no evidence for stenosis. There was trivial regurgitation.   Pulmonic valve:   The valve is structurally normal. Cusp separation was   normal.  Doppler:  Transvalvular velocity was within the normal range. There   was no evidence for stenosis. There was trivial regurgitation.   Pericardium:   There was no pericardial effusion.   Aorta:   Aortic root: The aortic root was normal.   Ascending aorta: The ascending aorta was normal.   Pulmonary arteries:   Systolic pressure was within the normal range, estimated to be 22mm Hg.   Systemic veins:  Central venous respirophasic diameter changes are in the   normal range (>50%).   Inferior vena cava: The IVC was normal-sized.     Cath 10/18:  Hemodynamics:  /10, LVEDP 16  /100, mean 122  No significant gradient upon Ao-LV pullback  Intervention:  6 Fr EBU 3.0 Guide used to engage LM  Opp Blue crossed proximal LAD lesion  IVUS showed soft plaque  PTCA using 2.5 x 15mm cutting balloon  LENCHO 3.0 x 20mm LENCHO to pLAD  P.E using 3.5 NC up to 16 alex  IVUS showed underexpanded stent  P.E using 4.0 x  12mm NC up to 16 alex  Angiogram showed AURA III flow (lesion 100% > 0%)  Closure: Radial band.     No immediate complications.    A/P:  100% proximal LAD occlusion s/p IVUS guided PCI  DAPT  GDMT for ICM/HFrEF  CTU     Review of Systems   Cardiovascular:  Negative for chest pain.   Respiratory:  Negative for shortness of breath.        Physical Exam:  General: Alert and oriented in no apparent distress.  HEENT: Pupils equal. Mucous membranes moist.   Neck: No JVD  Cardiac:  Normal S1 S2, Regular. No murmur  Lungs: Clear without wheezes or crackles    Abdomen: Soft, non-tender, ND  Extremities: Without clubbing, cyanosis or edema.    Neurologic: No focal deficits. Normal affect.  Skin: Warm and dry, radial site without hematoma    Medications:   losartan  25 mg Oral Daily    ticagrelor  90 mg Oral Q12H    aspirin  81 mg Oral Daily    carvedilol  6.25 mg Oral BID with meals    atorvastatin  40 mg Oral Nightly    azaTHIOprine  50 mg Oral Nightly         Assessment:    Anterior MI, NSTEMI CAD s/p PCI with LENCHO to proximal LAD 10/18/24  Presented with chest pain. Chest pain has now resolved  EKG with anterolateral TWI without ST elevation  Cardiac cath 10/18 as above, PCI to 100% proximal LAD occlusion. No significant stenosis elsewhere.  ASA 81 mg daily and Brilinta 90 mg BID. Coreg. High intensity statin.  Ischemic cardiomyopathy, LVEF 45-50%  Compensated, euvolemic  TTE 10/18 with mildly reduced LVEF in the setting of 100% LAD lesion which has been revascularized  GDMT: Coreg. Losartan. Could consider eventual MRA/SGLT2.  Crohn's disease  Hyperlipidemia - statin  HTN - controlled. Goal <130/80.    Plan:    Continue DAPT and discussed importance of strict compliance with ASA/Brilinta. BB. Statin. Tolerating addition of losartan for GDMT with mildly reduced LVEF/ICM.  Ok for discharge today from cardiac standpoint.      Plan of care discussed with patient, JESSICA.    DEBBIE Joe  10/20/2024  8:39 AM    As  noted,    Case discussed with patient at bedside.  She feels great.  She has been ambulating.  She wants to go home today.  I think this is reasonable.  She will continue with DAPT and meds as described.  She will follow-up in our office    Chico Garber MD  L2

## 2024-10-20 NOTE — PLAN OF CARE
Patient A/O x4. Patient on room air and reporting no pain throughout shift. Patient given phosphorus replacement before discharge. Cardiology and hospital ist has seen patient and have confirmed discharge.

## 2024-10-20 NOTE — PLAN OF CARE
Discharge orders received. Cardiology has signed off. Discharge teaching provided at bedside. All questions answered. Patient vitals stable prior to discharge and phosphorus replacement given prior to discharge. Patient transported by tech

## 2024-10-20 NOTE — DISCHARGE SUMMARY
[place] [facility]  DISCHARGE SUMMARY     Rosemarie Blackwell Patient Status:  Inpatient    1985 MRN [id number]   Location [facility] 2NE-A Attending No att. providers found   Hosp Day # 2 PCP Caty Pryor MD     Date of Admission: 10/18/2024  Date of Discharge:  10/20/2024     Discharge Disposition: Home or Self Care    Discharge Diagnosis:    # NSTEMI   -S/p coronary angiogram with 100% proximal LAD occlusion s/p IVUS guided PCI on 10/18  -DAPT, high intensity statin, BB     #Ischemic cardiomyopathy due to above  -EF is 45 to 50%     #Hyperlipidemia     # hyperglycemia , HgA1c 5.5    #Hyponatremia, resolved   #Crohn's disease  - Azathioprine  -On Remicade as outpatient       History of Present Illness: Rosemarie Blackwell is a 39 year old female with PMHx Crohn's disease on azathioprine and Remicade who presents to the hospital with chest pain that radiates to the back. Symptoms are constant for the past few weeks but they wax and wane in intensity.  There are no exacerbating or alleviating factors.  She does have associated nausea, dyspnea but no vomiting.  She did have an episode of diaphoresis and dizziness this morning and so sought medical attention after her  convinced her.  She denies any recent hospitalizations or travel.  Her father did have an aortic dissection but no other heart disease.  Her maternal uncle passed away from heart disease in his 30s or 40s.  She does not smoke.  She denies any recent cough or congestion.  In the ER, troponin was elevated at 1266.  EKG showed T wave inversions in the anteroseptal leads and slight ST depressions in the lateral leads.  CTA chest was negative for PE and there was no apparent thoracic aortic dissection.  Patient underwent coronary angiogram with PCI to LAD.       Brief Synopsis: patient admitted and underwent angiogram with stent placement in proximal LAD. She is doing well post procedure. Lifestyle modification and medication adherence strongly  encouraged.     Lace+ Score: 47  59-90 High Risk  29-58 Medium Risk  0-28   Low Risk       TCM Follow-Up Recommendation:  LACE 29-58: Moderate Risk of readmission after discharge from the hospital.    Consultants:  Cardiology     Discharge Medication List:     Discharge Medications        START taking these medications        Instructions Prescription details   aspirin 81 MG Tbec      Take 1 tablet (81 mg total) by mouth daily.   Quantity: 90 tablet  Refills: 3     atorvastatin 40 MG Tabs  Commonly known as: Lipitor      Take 1 tablet (40 mg total) by mouth nightly.   Quantity: 30 tablet  Refills: 2     carvedilol 6.25 MG Tabs  Commonly known as: Coreg      Take 1 tablet (6.25 mg total) by mouth 2 (two) times daily with meals.   Quantity: 60 tablet  Refills: 1     losartan 25 MG Tabs  Commonly known as: Cozaar      Take 1 tablet (25 mg total) by mouth daily.   Quantity: 30 tablet  Refills: 1     ticagrelor 90 MG Tabs  Commonly known as: Brilinta      Take 1 tablet (90 mg total) by mouth every 12 (twelve) hours.   Quantity: 60 tablet  Refills: 1            CONTINUE taking these medications        Instructions Prescription details   azaTHIOprine 50 MG Tabs  Commonly known as: Imuran      Take 1 tablet (50 mg total) by mouth daily.   Refills: 0     Ferrous Sulfate 324 (65 Fe) MG Tbec      Take 324 mg by mouth daily.   Refills: 0            STOP taking these medications      famotidine 40 MG Tabs  Commonly known as: Pepcid                  Where to Get Your Medications        These medications were sent to [id number] - [place], [place] - [address] AT [facility] [address], [phone], [phone]  [address], [place] [address]      Hours: 24-hours Phone: [phone]   aspirin 81 MG Tbec  atorvastatin 40 MG Tabs  carvedilol 6.25 MG Tabs  losartan 25 MG Tabs  ticagrelor 90 MG Tabs         [place]    Follow-up appointment:   B[facility]  [place]  [address] [address]  [phone]    Follow up in 1 week(s)  Our office will call to schedule appointment    Appointments for Next 30 Days 10/22/2024 - 2024        Date Arrival Time Visit Type Length Department Provider     2024 10:00 AM  [facility] PHASE II INI [2138] 60 min [facility] CARD PULM INITIAL    Patient Instructions:     Check with Insurance for coverage                                      First visit 1 1/2 hours                                                                   Dress comfortably                                                                                                                                                Location Instructions:     Your appointment is scheduled at [facility]. The address is&nbsp; [address]. To reach Registration, park in the [employer] Parking Garage. Go through the entrance doors located on the ground floor. Veer left past the Information Desk and proceed through the lobby past the staircase to check in at the Cardiopulmonary Rehab Registration desk.  Masks are optional for all patients and visitors, unless otherwise indicated.                      Vital signs:       Physical Exam:    General: No acute distress   Lungs: clear to auscultation  Cardiovascular: S1, S2  Abdomen: Soft NTND    -----------------------------------------------------------------------------------------------  PATIENT DISCHARGE INSTRUCTIONS: See electronic chart    Buffy Payne MD    Total time spent on discharge plannin minutes     The  [employer] Cures Act makes medical notes like these available to patients in the interest of transparency. Please be advised this is a medical document. Medical documents are intended to carry relevant information, facts as evident, and the clinical opinion of the practitioner. The medical note is intended as peer to peer communication and  may appear blunt or direct. It is written in medical language and may contain abbreviations or verbiage that are unfamiliar.

## 2024-10-22 NOTE — PAYOR COMM NOTE
--------------  DISCHARGE REVIEW    Payor: [employer]  Subscriber #:  [id number]  Authorization Number [id number]    Admit date: 10/18/24  Admit time:  12:18 PM  Discharge Date: 10/20/2024  1:20 PM     Admitting Physician: Bozena Durbin MD  Attending Physician:  Lorena att. providers found  Primary Care Physician: Caty Pryor MD       Discharge Summary Notes    No notes of this type exist for this encounter.

## 2024-10-22 NOTE — PAYOR COMM NOTE
ASKING FOR RECONSIDERATION OF INPT  10/18-10/20  ADMISSION REVIEW     Payor: [employer]  Subscriber #:  [id number]  Authorization Number: [id number]    Admit date: 10/18/24  Admit time: 12:18 PM       REVIEW DOCUMENTATION:     ED Provider Notes        ED Provider Notes signed by Jhonny Andrade MD at 10/18/2024  2:55 PM       Author: Jhonny Andrade MD Service: -- Author Type: Physician    Filed: 10/18/2024  2:55 PM Date of Service: 10/18/2024  9:31 AM Status: Addendum    : Jhonny Andrade MD (Physician)    Related Notes: Original Note by Jhonny Andrade MD (Physician) filed at 10/18/2024 11:31 AM           Patient Seen in: [facility] In [place]      History     Chief Complaint   Patient presents with    Chest Pain Angina     Stated Complaint: chest and back pain x 2 wks    Subjective:     HPI    39-year-old woman with history of Crohn's disease who  reports experiencing persistent chest pain that occasionally intensifies to the point of causing her to double over, fall, or feel nauseous. The pain is located in the upper chest and radiates to the back. The patient also reports episodes of shortness of breath when the pain is particularly severe. The patient denies any pain in the abdomen. She also denies any recent increase in stress levels. The patient has not sought medical attention for these symptoms before this visit.      Objective:   Past Medical History:    Crohn disease (HCC)    Crohn's colitis (HCC)              Past Surgical History:   Procedure Laterality Date    Cyst removal                  Social History     Socioeconomic History    Marital status:    Tobacco Use    Smoking status: Never    Smokeless tobacco: Never   Vaping Use    Vaping status: Never Used   Substance and Sexual Activity    Alcohol use: Never    Drug use: Never     Social Drivers of Health     Food Insecurity: No Food Insecurity (10/18/2024)    Food Insecurity     Food Insecurity: Never true    Transportation Needs: No Transportation Needs (10/18/2024)    Transportation Needs     Lack of Transportation: No   Housing Stability: Low Risk  (10/18/2024)    Housing Stability     Housing Instability: No              Review of Systems    Positive for stated complaint: chest and back pain x 2 wks  Other systems are as noted in HPI.  Constitutional and vital signs reviewed.      All other systems reviewed and negative except as noted above.    Physical Exam     ED Triage Vitals [10/18/24 0924]   BP (!) 163/106   Pulse 74   Resp 16   Temp 97.7 °F (36.5 °C)   Temp src Oral   SpO2 99 %   O2 Device None (Room air)       Current:BP (!) 145/109 (BP Location: Right arm)   Pulse 76   Temp 98 °F (36.7 °C) (Oral)   Resp 14   Ht 152.4 cm (5')   Wt 72.9 kg   LMP 09/11/2024 (Approximate)   SpO2 97%   BMI 31.39 kg/m²       General:  Vitals as listed.  No acute distress   HEENT: Sclerae anicteric.  Conjunctivae show no pallor.  Oropharynx clear, mucous membranes moist   Lungs: good air exchange and clear   Heart: regular rate rhythm and no murmur   Abdomen: Soft and nontender.  No abdominal masses.  No peritoneal signs   Extremities: no edema, normal peripheral pulses.  No calf tenderness or lower extremity symmetry  Neuro: Alert oriented and nonfocal      ED Course     Labs Reviewed   COMP METABOLIC PANEL (14) - Abnormal; Notable for the following components:       Result Value    Glucose 186 (*)     Sodium 132 (*)     Creatinine 1.08 (*)     A/G Ratio 0.9 (*)     All other components within normal limits   CBC WITH DIFFERENTIAL WITH PLATELET - Abnormal; Notable for the following components:    HGB 16.6 (*)     All other components within normal limits   TROPONIN I HIGH SENSITIVITY - Abnormal; Notable for the following components:    Troponin I (High Sensitivity) 1,266 (*)     All other components within normal limits   LIPID PANEL - Abnormal; Notable for the following components:    Cholesterol, Total 215 (*)      Triglycerides 376 (*)     LDL Cholesterol 105 (*)     VLDL 65 (*)     Non HDL Chol 169 (*)     All other components within normal limits   PROTHROMBIN TIME (PT) - Normal   PTT, ACTIVATED - Normal   HEMOGLOBIN A1C - Normal   C-RP/HIGH SENSITIVITY - Normal   POCT PREGNANCY URINE - Normal   SED RATE, WESTERGREN (AUTOMATED)   RAINBOW DRAW LAVENDER   RAINBOW DRAW LIGHT GREEN   RAINBOW DRAW BLUE     CTA CHEST (CPT=71275)    Result Date: 10/18/2024  CONCLUSION:  Negative for pulmonary embolism or other acute cardiopulmonary process.    LOCATION:  [id number]   Dictated by (CST): Carol Wright MD on 10/18/2024 at 10:30 AM     Finalized by (CST): Carol Wright MD on 10/18/2024 at 10:33 AM       XR CHEST AP PORTABLE  (CPT=71045)    Result Date: 10/18/2024  CONCLUSION:  Borderline heart size. No active disease seen.   LOCATION:  [id number]      Dictated by (CST): Hank Reyes MD on 10/18/2024 at 10:14 AM     Finalized by (CST): Hank Reyes MD on 10/18/2024 at 10:15 AM        I independently read the radiographs and no pulmonary edema on chest x-ray  EKG    Rate, intervals and axes as noted on EKG Report.  Rate: 72  Rhythm: Sinus Rhythm  Reading: Anterolateral T wave inversions.  No old EKG for comparison           ED COURSE and MDM       Differential diagnosis before testing included atypical chest pain versus thoracic aortic dissection, a medical condition that poses a threat to life.    I reviewed prior external notes including CT of the abdomen pelvis that was done 6/10/2024 and showed mild hydronephrosis    Patient given aspirin.    Case discussed with [facility] hospitalist, Dr. Durbin, notified.    Patient to be admitted to CTU for further intervention by cardiology    I have discussed with the patient the results of testing, differential diagnosis, and treatment plan. They expressed clear understanding of these instructions and agrees to the plan provided.    Disposition and Plan     Clinical Impression:  1. Myocardial  ischemia         Disposition:  Admit  10/18/2024 10:09 am    Follow-up:  No follow-up provider specified.      Medications Prescribed:  Current Discharge Medication List          Signed by Jhonny Andrade MD on 10/18/2024  2:55 PM         MEDICATIONS ADMINISTERED IN LAST 1 DAY:  Administration History       None            Vitals (last day) before discharge       Date/Time Temp Pulse Resp BP SpO2 Weight O2 Device O2 Flow Rate (L/min) Who    10/20/24 1244 98.3 °F (36.8 °C) 85 17 103/76 95 % -- None (Room air) -- CM    10/20/24 0900 98.6 °F (37 °C) 83 18 122/77 96 % -- None (Room air) -- RC    10/20/24 0818 98.6 °F (37 °C) 70 18 122/77 95 % -- None (Room air) -- GL    10/20/24 0555 96.5 °F (35.8 °C) 109 20 115/84 96 % -- -- -- KJ    10/19/24 2316 98.4 °F (36.9 °C) 78 18 112/77 95 % -- None (Room air) -- GC    10/19/24 1900 99 °F (37.2 °C) 97 17 122/91 97 % -- None (Room air) -- KJ    10/19/24 1703 98.2 °F (36.8 °C) 89 15 120/87 96 % -- None (Room air) -- AL    10/19/24 1700 -- 87 -- -- 93 % -- -- -- RC    10/19/24 1600 98.2 °F (36.8 °C) -- 15 120/87 -- -- -- -- RC    10/19/24 1500 -- 98 -- -- 96 % -- -- -- RC    10/19/24 1300 -- 86 -- -- 95 % -- -- -- RC    10/19/24 1238 98.1 °F (36.7 °C) 89 16 133/100 97 % -- None (Room air) -- AL    10/19/24 0908 98.8 °F (37.1 °C) 83 15 142/99 97 % -- None (Room air) -- AL    10/19/24 0900 98.8 °F (37.1 °C) -- 15 -- -- -- -- -- RC    10/19/24 0900 -- 88 -- 152/115 95 % -- -- -- GL     H&P    Chief Complaint: Chest pain        Subjective:  History of Present Illness:   Rosemarie Blackwell is a 39 year old female with PMHx Crohn's disease on azathioprine and Remicade who presents to the hospital with chest pain that radiates to the back. Symptoms are constant for the past few weeks but they wax and wane in intensity.  There are no exacerbating or alleviating factors.  She does have associated nausea, dyspnea but no vomiting.  She did have an episode of diaphoresis and dizziness this  morning and so sought medical attention after her  convinced her.  She denies any recent hospitalizations or travel.  Her father did have an aortic dissection but no other heart disease.  Her maternal uncle passed away from heart disease in his 30s or 40s.  She does not smoke.  She denies any recent cough or congestion.  In the ER, troponin was elevated at 1266.  EKG showed T wave inversions in the anteroseptal leads and slight ST depressions in the lateral leads.  CTA chest was negative for PE and there was no apparent thoracic aortic dissection.  Patient underwent coronary angiogram with PCI to LAD.         Assessment & Plan:  #NSTEMI type I   -S/p coronary angiogram with 100% proximal LAD occlusion s/p IVUS guided PCI on 10/18  -Cardiology consulted  -DAPT, high intensity statin     #Ischemic cardiomyopathy due to above  -EF is 45 to 50%  -Started on Coreg, introduce GDMT as hemodynamics allow     #Hyperlipidemia  -Started on statin     #Hyponatremia  -Monitor, encourage PO intake     #Crohn's disease  -Resume Azathioprine  -On Remicade as outpatient        Plan of care discussed with patient, , RN.    10/18 CARDIOLOGY CONSULT NOTE    Reason for Consultation:  Chest pain      History of Present Illness:  Rosemarie Blackwell is a(n) 39 year old female with chronic medical conditions including crohn's disease who presents with complaint of chest discomfort for the past 2 weeks.  Patient describes a tightness in her chest that radiates to her back.  Course seems to wax and wane.  Not consistently worse with exertion.  Patient states she has had associated nausea.  States she is concerned about potentially having a heart attack.  Denies recent viral-like syndrome, headache, visual changes, swelling, fevers, chills, palpitations, syncope.  States her father had history of aortic dissection in his 40s-50s.  Denies premature coronary artery history.  Denies illicit drug use, tobacco use.  Rare EtOH use.         Imaging/results:  EKG -sinus rhythm, T WI anteroseptal leads, slight ST depression lateral leads  Troponin 1266  CTA chest negative for PE.  No apparent thoracic aorta dissection.  No significant pulmonary edema or pericardial effusion or coronary calcification.        Assessment:  Chest pain, possible NSTEMI - scad, myocarditis in the differential  HLD  Crohn's disease        Plan:  Recommend left heart catheterization for definitive coronary evaluation.    Repeat EKG.  Patient was consented for left heart cardiac catheterization. The risks and benefits of the procedure were explained to the patient. 1-2% risk of coronary angiography, possible angioplasty, include, but are not limited to stroke, death, heart attack, coronary dissection requiring emergent CABG, hematoma, bleeding, allergic reaction to medications, vascular damage requiring surgical repair, kidney failure requiring dialysis and others. Patient wishes to proceed.  Echo  ESR/CRP, lipid panel   Further recs to follow   10/18 CATH NOTE    Primary Proceduralist: Shelton Ma MD  Procedure Performed: LHC, LAD IVUS, LAD PCI  Date of Procedure: 10/18/2024   Indication: NSTEMI  Pre Operative Diagnosis: NSTEMI  Post Operative Diagnosis: NSTEMI         Procedure/Technique:     Lidocaine 1% was administered locally. Access of right radial artery obtained using Seldinger technique. Ultrasound was not used.      6 Fr Sheath was inserted. J-wire advanced into the aorta under fluoroscopy.     Left coronary system engaged using 6 Fr XB 3.0 guide. Selective angiogram performed.      Right coronary system engaged using 6 FR TIG.  Selective angiogram performed.  Catheter advanced into the LV. Hemodynamics were obtained.     Coronary Angiogram Findings:  LM: Large caliber artery giving rise to LAD & LCX. No significant stenosis.  LAD: Large caliber artery giving rise to diagonal and septal branches. 100% proximal occlusion.  LCX: Medium to large caliber artery  giving rise to OM branches. No significant stenosis.  RCA: Large caliber artery giving rise to acute marginal branches, and bifurcates into RPDA & RPL. No significant stenosis.     Hemodynamics:  /10, LVEDP 16  /100, mean 122  No significant gradient upon Ao-LV pullback           Intervention:  6 Fr EBU 3.0 Guide used to engage LM  Pop Blue crossed proximal LAD lesion  IVUS showed soft plaque  PTCA using 2.5 x 15mm cutting balloon  LENCHO 3.0 x 20mm LENCHO to pLAD  P.E using 3.5 NC up to 16 alex  IVUS showed underexpanded stent  P.E using 4.0 x 12mm NC up to 16 alex  Angiogram showed AURA III flow (lesion 100% > 0%)     Monitored sedation administered by the cath lab RN, and supervised throughout the procedure by myself. Total time 65 minutes.      Closure: Radial band.     No immediate complications.    A/P:  100% proximal LAD occlusion s/p IVUS guided PCI  DAPT  GDMT for ICM/HFrEF  CTU         10/19 HOSPITALIST NOTE    Subjective:  Patient with no CP, SOB, VALVERDE.     Vital signs:  Temp:  [97 °F (36.1 °C)-98.8 °F (37.1 °C)] 98.8 °F (37.1 °C)  Pulse:  [69-85] 71  Resp:  [12-16] 15  BP: (129-164)/() 129/93  SpO2:  [95 %-100 %] 97 %     Labs:       Recent Labs   Lab 10/18/24  0927 10/19/24  0554   WBC 6.5 7.9   HGB 16.6* 15.0   MCV 89.0 88.2   .0 224.0   INR 0.93  --               Recent Labs   Lab 10/18/24  0927 10/19/24  0554   * 135*   BUN 9 6*   CREATSERUM 1.08* 0.98   CA 9.3 9.6   ALB 3.9 4.4   * 136   K 3.9 4.2    105   CO2 27.0 23.0   ALKPHO 63 52   AST 32 47*   ALT 33 24   BILT 1.3 1.4*   TP 8.2 7.4       Assessment & Plan:  #NSTEMI   -S/p coronary angiogram with 100% proximal LAD occlusion s/p IVUS guided PCI on 10/18  -Cardiology rec appreciated   -DAPT, high intensity statin, BB     #Ischemic cardiomyopathy due to above  -EF is 45 to 50%  -on Coreg,ASA, statin      #Hyperlipidemia  - on statin     # hyperglycemia , check HgA1c     #Hyponatremia, resolved   -Monitor,  encourage PO intake     #Crohn's disease  - Azathioprine  -On Remicade as outpatient     Dc when cleared per cards     10/19 CARDIOLOGY NOTE    Subjective:  Doing well this morning. Denies chest pain. Denies shortness of breath. No cardiac concerns from overnight.     Objective:  BP (!) 142/99 (BP Location: Left arm)   Pulse 83   Temp 98.8 °F (37.1 °C) (Oral)   Resp 15   Ht 5' (1.524 m)   Wt 160 lb 11.5 oz (72.9 kg)   LMP 09/11/2024 (Approximate)   SpO2 97%   BMI 31.39 kg/m²      Intake/Output:     Intake/Output Summary (Last 24 hours) at 10/19/2024 1129  Last data filed at 10/19/2024 0900      Gross per 24 hour   Intake 440 ml   Output 50 ml   Net 390 ml        Labs:       Recent Labs   Lab 10/18/24  0927 10/19/24  0554   * 135*   BUN 9 6*   CREATSERUM 1.08* 0.98   EGFRCR 67 75   CA 9.3 9.6   * 136   K 3.9 4.2    105   CO2 27.0 23.0           Recent Labs   Lab 10/18/24  0927 10/19/24  0554   RBC 5.16 4.84   HGB 16.6* 15.0   HCT 45.9 42.7   MCV 89.0 88.2   MCH 32.2 31.0   MCHC 36.2 35.1   RDW 12.8 12.7   NEPRELIM 4.29  --    WBC 6.5 7.9   .0 224.0          Assessment:     NSTEMI, CAD s/p PCI with LENCHO to proximal LAD 10/18/24  Presented with chest pain. Chest pain has now resolved. HsTrop 1266  Cardiac cath 10/18 as above, PCI to 100% proximal LAD occlusion. No significant stenosis elsewhere.  ASA 81 mg daily and Brilinta 90 mg BID. Coreg. High intensity statin.  Ischemic cardiomyopathy, LVEF 45-50%  Compensated, euvolemic  TTE 10/18 with mildly reduced LVEF in the setting of 100% LAD lesion which has been revascularized  GDMT: Coreg. Start Losartan 25 mg daily. Could consider eventual MRA/SGLT2.  Crohn's disease  Hyperlipidemia - statin  HTN - BP mildly elevated, starting losartan. Goal <130/80.     Plan:     Continue DAPT and discussed importance of strict compliance with ASA/Brilinta. BB. Statin. Starting losartan 25 mg daily today for GDMT with mildly reduced LVEF/ICM.  Likely  discharge tomorrow from cardiac standpoint.     10/20 CARDIOLOGY NOTE    Subjective:  Doing well this morning. Just finishing ambulating the halls without VALVERDE or chest discomfort. Denies chest pain. Denies shortness of breath. No cardiac concerns from overnight. Feeling ready to go home.      Objective:  /77 (BP Location: Right arm)   Pulse 70   Temp 98.6 °F (37 °C) (Oral)   Resp 18   Ht 5' (1.524 m)   Wt 160 lb 11.5 oz (72.9 kg)   LMP 09/11/2024 (Approximate)   SpO2 95%   BMI 31.39 kg/m²      Labs:        Recent Labs   Lab 10/18/24  0927 10/19/24  0554 10/20/24  0607   * 135* 120*   BUN 9 6* 8*   CREATSERUM 1.08* 0.98 0.97   EGFRCR 67 75 76   CA 9.3 9.6 9.5   * 136 137   K 3.9 4.2 4.2    105 107   CO2 27.0 23.0 26.0          Assessment:     Anterior MI, NSTEMI CAD s/p PCI with LENCHO to proximal LAD 10/18/24  Presented with chest pain. Chest pain has now resolved  EKG with anterolateral TWI without ST elevation  Cardiac cath 10/18 as above, PCI to 100% proximal LAD occlusion. No significant stenosis elsewhere.  ASA 81 mg daily and Brilinta 90 mg BID. Coreg. High intensity statin.  Ischemic cardiomyopathy, LVEF 45-50%  Compensated, euvolemic  TTE 10/18 with mildly reduced LVEF in the setting of 100% LAD lesion which has been revascularized  GDMT: Coreg. Losartan. Could consider eventual MRA/SGLT2.  Crohn's disease  Hyperlipidemia - statin  HTN - controlled. Goal <130/80.     Plan:     Continue DAPT and discussed importance of strict compliance with ASA/Brilinta. BB. Statin. Tolerating addition of losartan for GDMT with mildly reduced LVEF/ICM.  Ok for discharge today from cardiac standpoint.

## 2024-11-21 ENCOUNTER — APPOINTMENT (OUTPATIENT)
Dept: CARDIAC REHAB | Facility: HOSPITAL | Age: 39
End: 2024-11-21
Attending: STUDENT IN AN ORGANIZED HEALTH CARE EDUCATION/TRAINING PROGRAM
Payer: MEDICAID

## 2024-12-02 ENCOUNTER — APPOINTMENT (OUTPATIENT)
Dept: CARDIAC REHAB | Facility: HOSPITAL | Age: 39
End: 2024-12-02
Attending: STUDENT IN AN ORGANIZED HEALTH CARE EDUCATION/TRAINING PROGRAM
Payer: MEDICAID

## (undated) NOTE — Clinical Note
October 18, 2024    Patient: Rosemarie Blackwell   Date of Visit: 10/18/2024       To Whom It May Concern:    Rosemarie Blackwell was seen and treated in our emergency department on 10/18/2024. She {Return to school/sport/work:[id number]}.    If you have any questions or concerns, please don't hesitate to call.       Encounter Provider(s):    Jhonny Andrade MD Injam, Meghana, MD

## (undated) NOTE — LETTER
[address]  [address]  Consent for Procedure/Sedation  Date: 10/18/24         Time: 1317    I hereby authorize Dr. Ma, my physician and his/her assistants (if applicable), which may include medical students, residents, and/or fellows, to perform the following surgical operation/ procedure and administer such anesthesia as may be determined necessary by my physician:  Operation/Procedure name (s)  Cardiac Catheterization, Left Ventricular Cineangiography, Bilateral Selective Coronary Angiography and/or Right Heart Catheterization; possible Percutaneous Transluminal Coronary Angioplasty, Coronary Atherectomy, Coronary Stent, Intracoronary Thrombolytic therapy, Antiplatelet therapy and/or Intravascular Ultrasound on [facility]   2.   I recognize that during the surgical operation/procedure, unforeseen conditions may necessitate additional or different procedures than those listed above.  I, therefore, further authorize and request that the above-named surgeon, assistants, or designees perform such procedures as are, in their judgment, necessary and desirable.    3.   My surgeon/physician has discussed prior to my surgery the potential benefits, risks and side effects of this procedure; the likelihood of achieving goals; and potential problems that might occur during recuperation.  They also discussed reasonable alternatives to the procedure, including risks, benefits, and side effects related to the alternatives and risks related to not receiving this procedure.  I have had all my questions answered and I acknowledge that no guarantee has been made as to the result that may be obtained.    4.   Should the need arise during my operation/procedure, which includes change of level of care prior to discharge, I also consent to the administration of blood and/or blood products.  Further, I understand that despite careful testing and screening of blood or blood products by  collecting agencies, I may still be subject to ill effects as a result of receiving a blood transfusion and/or blood products.  The following are some, but not all, of the potential risks that can occur: fever and allergic reactions, hemolytic reactions, transmission of diseases such as Hepatitis, AIDS and Cytomegalovirus (CMV) and fluid overload.  In the event that I wish to have an autologous transfusion of my own blood, or a directed donor transfusion, I will discuss this with my physician.  Check only if Refusing Blood or Blood Products  I understand refusal of blood or blood products as deemed necessary by my physician may have serious consequences to my condition to include possible death. I hereby assume responsibility for my refusal and release the hospital, its personnel, and my physicians from any responsibility for the consequences of my refusal.          o  Refuse      5.   I authorize the use of any specimen, organs, tissues, body parts or foreign objects that may be removed from my body during the operation/procedure for diagnosis, research or teaching purposes and their subsequent disposal by hospital authorities.  I also authorize the release of specimen test results and/or written reports to my treating physician on the hospital medical staff or other referring or consulting physicians involved in my care, at the discretion of the Pathologist or my treating physician.    6.   I consent to the photographing or videotaping of the operations or procedures to be performed, including appropriate portions of my body for medical, scientific, or educational purposes, provided my identity is not revealed by the pictures or by descriptive texts accompanying them.  If the procedure has been photographed/videotaped, the surgeon will obtain the original picture, image, videotape or CD.  The hospital will not be responsible for storage, release or maintenance of the picture, image, tape or CD.    7.   I consent  to the presence of a  or observers in the operating room as deemed necessary by my physician or their designees.    8.   I recognize that in the event my procedure results in extended X-Ray/fluoroscopy time, I may develop a skin reaction.    9. If I have a Do Not Attempt Resuscitation (DNAR) order in place, that status will be suspended while in the operating room, procedural suite, and during the recovery period unless otherwise explicitly stated by me (or a person authorized to consent on my behalf). The surgeon or my attending physician will determine when the applicable recovery period ends for purposes of reinstating the DNAR order.  10. Patients having a sterilization procedure: I understand that if the procedure is successful the results will be permanent and it will therefore be impossible for me to inseminate, conceive, or bear children.  I also understand that the procedure is intended to result in sterility, although the result has not been guaranteed.   11. I acknowledge that my physician has explained sedation/analgesia administration to me including the risk and benefits I consent to the administration of sedation/analgesia as may be necessary or desirable in the judgment of my physician.    I CERTIFY THAT I HAVE READ AND FULLY UNDERSTAND THE ABOVE CONSENT TO OPERATION and/or OTHER PROCEDURE.      ____________________________________       _________________________________      ______________________________  Signature of Patient         Signature of Responsible Person        Printed Name of Responsible Person   ____________________________________      _________________________________      ______________________________       Signature of Witness          Relationship to Patient                       Date                                       Time  Patient Name: Rosemarie Blackwell  : 1985    Reviewed: 2024   Printed: 2024  Medical Record #: [id number] Page 1 of  1